# Patient Record
Sex: MALE | Race: WHITE | NOT HISPANIC OR LATINO | Employment: FULL TIME | URBAN - METROPOLITAN AREA
[De-identification: names, ages, dates, MRNs, and addresses within clinical notes are randomized per-mention and may not be internally consistent; named-entity substitution may affect disease eponyms.]

---

## 2020-03-12 ENCOUNTER — OFFICE VISIT (OUTPATIENT)
Dept: URGENT CARE | Facility: CLINIC | Age: 65
End: 2020-03-12
Payer: COMMERCIAL

## 2020-03-12 ENCOUNTER — APPOINTMENT (OUTPATIENT)
Dept: RADIOLOGY | Facility: CLINIC | Age: 65
End: 2020-03-12
Payer: COMMERCIAL

## 2020-03-12 VITALS
HEART RATE: 49 BPM | SYSTOLIC BLOOD PRESSURE: 132 MMHG | HEIGHT: 71 IN | TEMPERATURE: 97 F | WEIGHT: 230 LBS | OXYGEN SATURATION: 99 % | DIASTOLIC BLOOD PRESSURE: 56 MMHG | BODY MASS INDEX: 32.2 KG/M2 | RESPIRATION RATE: 16 BRPM

## 2020-03-12 DIAGNOSIS — S93.402A SPRAIN OF LEFT ANKLE, UNSPECIFIED LIGAMENT, INITIAL ENCOUNTER: ICD-10-CM

## 2020-03-12 DIAGNOSIS — S99.912A ANKLE INJURY, LEFT, INITIAL ENCOUNTER: Primary | ICD-10-CM

## 2020-03-12 DIAGNOSIS — S99.912A ANKLE INJURY, LEFT, INITIAL ENCOUNTER: ICD-10-CM

## 2020-03-12 PROCEDURE — 73610 X-RAY EXAM OF ANKLE: CPT

## 2020-03-12 PROCEDURE — 99213 OFFICE O/P EST LOW 20 MIN: CPT | Performed by: PHYSICIAN ASSISTANT

## 2020-03-12 RX ORDER — ATORVASTATIN CALCIUM 10 MG/1
TABLET, FILM COATED ORAL
COMMUNITY
Start: 2020-01-17

## 2020-03-12 RX ORDER — LOSARTAN POTASSIUM 50 MG/1
TABLET ORAL
COMMUNITY
Start: 2020-01-17

## 2020-03-12 RX ORDER — MELOXICAM 15 MG/1
TABLET ORAL
COMMUNITY
Start: 2020-03-07

## 2020-03-12 RX ORDER — HYDROCHLOROTHIAZIDE 25 MG/1
TABLET ORAL
COMMUNITY
Start: 2020-03-07

## 2020-03-12 RX ORDER — ASPIRIN 81 MG/1
81 TABLET, CHEWABLE ORAL DAILY
COMMUNITY

## 2020-03-12 RX ORDER — PROPRANOLOL HCL 60 MG
CAPSULE, EXTENDED RELEASE 24HR ORAL
COMMUNITY
Start: 2020-03-07

## 2020-03-12 RX ORDER — AMLODIPINE BESYLATE 10 MG/1
25 TABLET ORAL
COMMUNITY
Start: 2020-03-07

## 2020-03-12 NOTE — LETTER
March 12, 2020     Patient: Christie Wilks   YOB: 1955   Date of Visit: 3/12/2020       To Whom It May Concern: It is my medical opinion that Christie Wilks may return to work on 3/16/2020  If you have any questions or concerns, please don't hesitate to call           Sincerely,        Oziel Roque PA-C    CC: No Recipients

## 2020-03-12 NOTE — PROGRESS NOTES
[facility]        NAME: Isaac Livingston is a 59 y o  male  : 1955    MRN: [id number]  DATE: 2020  TIME: 9:37 AM    /56   Pulse (!) 49   Temp (!) 97 °F (36 1 °C) (Tympanic)   Resp 16   Ht 5' 11" (1 803 m)   Wt 104 kg (230 lb)   SpO2 99%   BMI 32 08 kg/m²     Assessment and Plan   Ankle injury, left, initial encounter [S93 822A]  1  Ankle injury, left, initial encounter  XR ankle 3+ vw left    Crutches    Splint   2  Sprain of left ankle, unspecified ligament, initial encounter  Crutches    Splint    Ambulatory referral to Orthopedic Surgery         Patient Instructions       Follow up with PCP in 3-5 days  Proceed to  ER if symptoms worsen  Chief Complaint     Chief Complaint   Patient presents with    Ankle Injury     twisted left ankle at [facility] last night and fell onto left ankle  Pain upon weight bearing  Screened for corona virus  Negative screening  History of Present Illness       Pt with left ankle pain and swelling since falling and twisting it last justin     Ankle Injury    The incident occurred 12 to 24 hours ago  The incident occurred at home  The injury mechanism was a twisting injury and a fall  The pain is present in the left ankle  The quality of the pain is described as aching  The pain is at a severity of 5/10  The pain is moderate  The pain has been constant since onset  Pertinent negatives include no inability to bear weight, loss of motion, loss of sensation, muscle weakness, numbness or tingling  He reports no foreign bodies present  The symptoms are aggravated by movement and weight bearing  He has tried nothing for the symptoms  The treatment provided no relief  Review of Systems   Review of Systems   Constitutional: Negative  HENT: Negative  Eyes: Negative  Respiratory: Negative  Cardiovascular: Negative  Gastrointestinal: Negative  Endocrine: Negative  Genitourinary: Negative  Musculoskeletal: Negative  Skin: Negative  Allergic/Immunologic: Negative  Neurological: Negative  Negative for tingling and numbness  Hematological: Negative  All other systems reviewed and are negative  Current Medications       Current Outpatient Medications:     amLODIPine (NORVASC) 10 mg tablet, , Disp: , Rfl:     aspirin 81 mg chewable tablet, Chew 81 mg daily, Disp: , Rfl:     atorvastatin (LIPITOR) 10 mg tablet, , Disp: , Rfl:     hydrochlorothiazide (HYDRODIURIL) 25 mg tablet, , Disp: , Rfl:     losartan (COZAAR) 50 mg tablet, , Disp: , Rfl:     meloxicam (MOBIC) 15 mg tablet, , Disp: , Rfl:     propranolol (INDERAL LA) 60 mg 24 hr capsule, , Disp: , Rfl:     Current Allergies     Allergies as of 03/12/2020 - Reviewed 03/12/2020   Allergen Reaction Noted    Adhesive [medical tape] Rash 03/12/2020            The following portions of the patient's history were reviewed and updated as appropriate: allergies, current medications, past family history, past medical history, past social history, past surgical history and problem list      Past Medical History:   Diagnosis Date    Arthritis     High cholesterol     Hypertension        Past Surgical History:   Procedure Laterality Date    ADENOIDECTOMY      CARPAL TUNNEL RELEASE Bilateral     MENISCECTOMY Bilateral     TONSILLECTOMY         Family History   Problem Relation Age of Onset    Heart disease Mother     Arthritis Mother     Heart disease Father          Medications have been verified  Objective   /56   Pulse (!) 49   Temp (!) 97 °F (36 1 °C) (Tympanic)   Resp 16   Ht 5' 11" (1 803 m)   Wt 104 kg (230 lb)   SpO2 99%   BMI 32 08 kg/m²        Physical Exam     Physical Exam   Constitutional: He is oriented to person, place, and time  He appears well-developed and well-nourished  HENT:   Head: Normocephalic and atraumatic     Right Ear: External ear normal    Left Ear: External ear normal    Nose: Nose normal  Mouth/Throat: Oropharynx is clear and moist    Eyes: Pupils are equal, round, and reactive to light  Conjunctivae and EOM are normal    Neck: Normal range of motion  Neck supple  Cardiovascular: Normal rate, regular rhythm, normal heart sounds and intact distal pulses  Pulmonary/Chest: Effort normal and breath sounds normal    Abdominal: Soft  Bowel sounds are normal    Musculoskeletal: Normal range of motion  Left ankle medial malleolus tender and swelling and ecchymosis  Lateral malleolus tender and swellng  dp pulse strong from ankle and toes with pain no foot pain  No achilles pain  No heel pain  No laxity     Neurological: He is alert and oriented to person, place, and time  Skin: Skin is warm  Capillary refill takes less than 2 seconds  Psychiatric: He has a normal mood and affect  His behavior is normal    Nursing note and vitals reviewed

## 2020-03-12 NOTE — PATIENT INSTRUCTIONS

## 2020-03-13 ENCOUNTER — APPOINTMENT (OUTPATIENT)
Dept: RADIOLOGY | Facility: CLINIC | Age: 65
End: 2020-03-13
Payer: COMMERCIAL

## 2020-03-13 VITALS
BODY MASS INDEX: 33.33 KG/M2 | HEIGHT: 71 IN | HEART RATE: 53 BPM | WEIGHT: 238.1 LBS | SYSTOLIC BLOOD PRESSURE: 154 MMHG | RESPIRATION RATE: 18 BRPM | DIASTOLIC BLOOD PRESSURE: 84 MMHG

## 2020-03-13 DIAGNOSIS — S93.422A SPRAIN OF DELTOID LIGAMENT OF LEFT ANKLE, INITIAL ENCOUNTER: ICD-10-CM

## 2020-03-13 DIAGNOSIS — M25.572 ACUTE LEFT ANKLE PAIN: Primary | ICD-10-CM

## 2020-03-13 DIAGNOSIS — M25.572 ACUTE LEFT ANKLE PAIN: ICD-10-CM

## 2020-03-13 PROCEDURE — 99203 OFFICE O/P NEW LOW 30 MIN: CPT | Performed by: ORTHOPAEDIC SURGERY

## 2020-03-13 PROCEDURE — 73600 X-RAY EXAM OF ANKLE: CPT

## 2020-03-13 NOTE — PROGRESS NOTES
ASSESSMENT/PLAN:    Diagnoses and all orders for this visit:    Acute left ankle pain    Sprain of deltoid ligament of left ankle, initial encounter        Plan:  I would recommend use of a walker cast boot  He is permitted to bear weight if tolerated, using crutches as needed  The boot may be removed for periods of inactivity if he chooses  He may shower or bathe, but I would recommend he sit to do so so as to avoid any further aggravation of his ankle  I will see him in 1 week  If symptoms do not seem to be improving, or if he is still unable to actively invert, an MRI will be obtained  He is to contact me if questions or concerns arise  No follow-ups on file       _____________________________________________________  CHIEF COMPLAINT:  Chief Complaint   Patient presents with    Left Ankle - Pain         SUBJECTIVE:  Yari Joyce is a 59y o  year old male who presents for evaluation of his left foot  He injured his left foot/ankle on 03/11/2020  He states 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 but is unsure of the exact mechanism of injury other than turning toward his left, twisting and falling  He was able to bear weight although with significant difficulty but could not continue bowling  Symptoms were worsening by the morning of 03/12/2020 and he presented to the urgent care center  X-rays were obtained and he was placed into a splint and instructed to seek orthopedic follow-up  He notes improvement with the splint in place  He denies paresthesias  He denies a history of previous injury or prodrome will symptoms  He denies any additional injuries of significance, stating only that he had a little scrape on his elbow      PAST MEDICAL HISTORY:  Past Medical History:   Diagnosis Date    Arthritis     High cholesterol     Hypertension        PAST SURGICAL HISTORY:  Past Surgical History:   Procedure Laterality Date    ADENOIDECTOMY      CARPAL TUNNEL RELEASE Bilateral     MENISCECTOMY Bilateral     TONSILLECTOMY         FAMILY HISTORY:  Family History   Problem Relation Age of Onset    Heart disease Mother     Arthritis Mother     Heart disease Father        SOCIAL HISTORY:  Social History     Tobacco Use    Smoking status: Never Smoker    Smokeless tobacco: Current User     Types: Chew   Substance Use Topics    Alcohol use: Never     Frequency: Never    Drug use: Never       MEDICATIONS:    Current Outpatient Medications:     amLODIPine (NORVASC) 10 mg tablet, , Disp: , Rfl:     aspirin 81 mg chewable tablet, Chew 81 mg daily, Disp: , Rfl:     atorvastatin (LIPITOR) 10 mg tablet, , Disp: , Rfl:     hydrochlorothiazide (HYDRODIURIL) 25 mg tablet, , Disp: , Rfl:     losartan (COZAAR) 50 mg tablet, , Disp: , Rfl:     meloxicam (MOBIC) 15 mg tablet, , Disp: , Rfl:     propranolol (INDERAL LA) 60 mg 24 hr capsule, , Disp: , Rfl:     ALLERGIES:  Allergies   Allergen Reactions    Adhesive [Medical Tape] Rash       Review of systems:   Constitutional: Negative for fatigue, fever or loss of apetite  HENT: Negative  Respiratory: Negative for shortness of breath, dyspnea  Cardiovascular: Negative for chest pain/tightness  Gastrointestinal: Negative for abdominal pain, N/V  Endocrine: Negative for cold/heat intolerance, unexplained weight loss/gain  Genitourinary: Negative for flank pain, dysuria, hematuria  Musculoskeletal:  Positive as in the HPI   Skin: Negative for rash  Neurological:  Negative  Psychiatric/Behavioral: Negative for agitation  _____________________________________________________  PHYSICAL EXAMINATION:    Blood pressure 154/84, pulse (!) 53, resp  rate 18, height 5' 11" (1 803 m), weight 108 kg (238 lb 1 6 oz)      General: well developed and well nourished, alert, oriented times 3 and appears comfortable  Psychiatric: Normal  HEENT: Benign  Cardiovascular: Regular    Pulmonary: No wheezing or stridor  Abdomen: Soft, Nontender  Skin: No masses, erythema, lacerations, fluctation, ulcerations  Neurovascular: Motor and sensory exams are grossly intact excluding decreased active inversion adduction of the left foot and ankle  Pulses are palpable and good color and capillary refill is noted  MUSCULOSKELETAL EXAMINATION:    The left foot and ankle exam demonstrated the splint in place upon arrival   This was removed without difficulty  There is significant ecchymosis and moderate swelling of the medial ankle and foot  He does have some tenderness laterally  He has most significant tenderness over the medial malleolus and the deltoid ligament  The fibular shaft was nontender  Syndesmotic compression elicits no complaints  The tibial shaft is nontender  He has no tenderness to palpation of the bony and soft tissue structures of the foot excluding the soft tissues slightly distal to the medial malleolus  He is able to actively dorsiflex, plantar flex and gavin the foot and ankle without difficulty  He has limited inversion  The remainder of the left lower extremity exam is benign  The bilateral upper extremity examination and right lower extremity examinations are benign  _____________________________________________________  STUDIES REVIEWED:  X-rays of his ankle demonstrated no evidence of bony injury  However, on the mortise image there does appear to be slight widening of the medial joint space in comparison to the superior joint space  A gravity stress x-ray of his left ankle was obtained  There is no change in the medial joint space of any significance      PROCEDURES:  Aric Samayoa

## 2020-03-20 ENCOUNTER — OFFICE VISIT (OUTPATIENT)
Dept: OBGYN CLINIC | Facility: CLINIC | Age: 65
End: 2020-03-20
Payer: COMMERCIAL

## 2020-03-20 VITALS
HEART RATE: 50 BPM | SYSTOLIC BLOOD PRESSURE: 130 MMHG | HEIGHT: 71 IN | WEIGHT: 235.89 LBS | DIASTOLIC BLOOD PRESSURE: 78 MMHG | BODY MASS INDEX: 33.02 KG/M2

## 2020-03-20 DIAGNOSIS — M25.572 ACUTE LEFT ANKLE PAIN: ICD-10-CM

## 2020-03-20 DIAGNOSIS — S93.422D SPRAIN OF DELTOID LIGAMENT OF LEFT ANKLE, SUBSEQUENT ENCOUNTER: Primary | ICD-10-CM

## 2020-03-20 PROCEDURE — 99213 OFFICE O/P EST LOW 20 MIN: CPT | Performed by: ORTHOPAEDIC SURGERY

## 2020-03-20 NOTE — PROGRESS NOTES
ASSESSMENT/PLAN:    Diagnoses and all orders for this visit:    Sprain of deltoid ligament of left ankle, subsequent encounter    Acute left ankle pain        Plan:  I discussed follow-up  At this time, secondary to the COVID-19 virus, I would recommend that he follow up as needed  I offered to see him in 2 weeks but he would prefer to return only if he feels his ankle does not continue improving  I have encouraged him to contact me and arrange for follow-up if he does not see continued improvement over the next 2 weeks  Return if symptoms worsen or fail to improve       _____________________________________________________  CHIEF COMPLAINT:  Chief Complaint   Patient presents with    Left Ankle - Pain, Follow-up         SUBJECTIVE:  Patito Muller is a 59y o  year old male who presents for follow up of his left ankle  He notes improvement over the last week  He has been able to walk around the house a little bit without a boot on  He is no longer experiencing any pain when he is in the boot  He removes the boot for sleeping without difficulty and for showering or bathing without difficulty  He does continue to experience some mild medial-sided ankle pain  Fiorella Little       PAST MEDICAL HISTORY:  Past Medical History:   Diagnosis Date    Arthritis     High cholesterol     Hypertension        PAST SURGICAL HISTORY:  Past Surgical History:   Procedure Laterality Date    ADENOIDECTOMY      CARPAL TUNNEL RELEASE Bilateral     MENISCECTOMY Bilateral     TONSILLECTOMY         FAMILY HISTORY:  Family History   Problem Relation Age of Onset    Heart disease Mother     Arthritis Mother     Heart disease Father        SOCIAL HISTORY:  Social History     Tobacco Use    Smoking status: Never Smoker    Smokeless tobacco: Current User     Types: Chew   Substance Use Topics    Alcohol use: Never     Frequency: Never    Drug use: Never       MEDICATIONS:    Current Outpatient Medications:     amLODIPine (NORVASC) 10 mg tablet, , Disp: , Rfl:     aspirin 81 mg chewable tablet, Chew 81 mg daily, Disp: , Rfl:     atorvastatin (LIPITOR) 10 mg tablet, , Disp: , Rfl:     hydrochlorothiazide (HYDRODIURIL) 25 mg tablet, , Disp: , Rfl:     losartan (COZAAR) 50 mg tablet, , Disp: , Rfl:     meloxicam (MOBIC) 15 mg tablet, , Disp: , Rfl:     propranolol (INDERAL LA) 60 mg 24 hr capsule, , Disp: , Rfl:     ALLERGIES:  Allergies   Allergen Reactions    Adhesive [Medical Tape] Rash       REVIEW OF SYSTEMS:  Pertinent items are noted in HPI  A comprehensive review of systems was negative       _____________________________________________________  PHYSICAL EXAMINATION:  General: alert, oriented times 3 and appears comfortable  Psychiatric: Normal  HEENT:  Normocephalic, atraumatic  Cardiovascular:  Regular  Pulmonary: No wheezing or stridor  Skin: No masses, erthema, lacerations, fluctation, ulcerations  Neurovascular: Motor and sensory exams are intact including good strength of resisted inversion and eversion of the ankle without complaints  Pulses are palpable and good color and capillary refill is noted  MUSCULOSKELETAL EXAMINATION:    The left ankle exam demonstrates some persistent swelling  He has mild tenderness over the anterior talofibular ligament  He also has some mild tenderness over the medial malleolus but more significant tenderness over the deltoid ligament  He demonstrated active inversion and good strength against resisted inversion without complaints  Sensation is intact  No complaints or elicited with subtalar or forefoot motion passively          Pati Peterson

## 2020-03-25 ENCOUNTER — OFFICE VISIT (OUTPATIENT)
Dept: URGENT CARE | Facility: CLINIC | Age: 65
End: 2020-03-25
Payer: COMMERCIAL

## 2020-03-25 VITALS
TEMPERATURE: 96.9 F | HEIGHT: 70 IN | BODY MASS INDEX: 32.93 KG/M2 | OXYGEN SATURATION: 99 % | DIASTOLIC BLOOD PRESSURE: 69 MMHG | SYSTOLIC BLOOD PRESSURE: 137 MMHG | WEIGHT: 230 LBS | RESPIRATION RATE: 16 BRPM | HEART RATE: 60 BPM

## 2020-03-25 DIAGNOSIS — S39.012A STRAIN OF MUSCLE, FASCIA AND TENDON OF LOWER BACK, INITIAL ENCOUNTER: Primary | ICD-10-CM

## 2020-03-25 PROCEDURE — 99213 OFFICE O/P EST LOW 20 MIN: CPT | Performed by: PHYSICIAN ASSISTANT

## 2020-03-25 RX ORDER — METHOCARBAMOL 750 MG/1
750 TABLET, FILM COATED ORAL EVERY 6 HOURS PRN
Qty: 20 TABLET | Refills: 0 | Status: SHIPPED | OUTPATIENT
Start: 2020-03-25 | End: 2020-03-30

## 2020-03-25 NOTE — PROGRESS NOTES
[facility]        NAME: Beth Moser is a 59 y o  male  : 1955    MRN: [id number]  DATE: 2020  TIME: 8:57 AM    Assessment and Plan   Strain of muscle, fascia and tendon of lower back, initial encounter [S39 012A]  1  Strain of muscle, fascia and tendon of lower back, initial encounter  methocarbamol (ROBAXIN) 750 mg tablet    Ambulatory referral to Physical Therapy     Patient advised to do back exercises at home for 1 week before making appointment with PT due to COVID 19  Patient Instructions   Robaxin as needed for muscle spasm  May use over-the-counter Tylenol and ibuprofen for pain  Do at home back exercises  Remain active! Follow-up with physical therapy if symptoms do not improve over the next week  Follow up with PCP in 3-5 days  Proceed to  ER if symptoms worsen  Chief Complaint     Chief Complaint   Patient presents with    Back Pain     c/o right sided lower back pain that radiates around to anterior thigh after using a walking boot followed by switching to leg brace after injuring left ankle 2 weeks ago  No fever, no SOB, no cough, no known contact with covid patient or travel to high risk areas  History of Present Illness       Patient is a 60-year-old male with significant past medical history of hypertension and hyperlipidemia who presents to the office complaining of right lower back pain for 6 days  Patient recently had an left ankle injury approximately 2 weeks ago and admits to walking with an antalgic gait  Patient states the pain is both achy and sharp  It radiates into the right gluteus and right groin  He reports having a dull ache with walking  He reports a pulling sensation with back extension and leg flexion  He has been taking meloxicam and Aleve with no relief  He has also been using ice and doing back exercises/stretching  Nothing makes it better    Patient denies numbness/tingling, saddle anesthesia, or loss of bowel or urine         Review of Systems   Review of Systems   Constitutional: Negative for chills and fever  Musculoskeletal: Positive for back pain  Negative for joint swelling and neck pain  Skin: Negative for wound  Neurological: Negative for numbness  Current Medications       Current Outpatient Medications:     amLODIPine (NORVASC) 10 mg tablet, , Disp: , Rfl:     aspirin 81 mg chewable tablet, Chew 81 mg daily, Disp: , Rfl:     atorvastatin (LIPITOR) 10 mg tablet, , Disp: , Rfl:     hydrochlorothiazide (HYDRODIURIL) 25 mg tablet, , Disp: , Rfl:     losartan (COZAAR) 50 mg tablet, , Disp: , Rfl:     meloxicam (MOBIC) 15 mg tablet, , Disp: , Rfl:     propranolol (INDERAL LA) 60 mg 24 hr capsule, , Disp: , Rfl:     methocarbamol (ROBAXIN) 750 mg tablet, Take 1 tablet (750 mg total) by mouth every 6 (six) hours as needed for muscle spasms for up to 5 days, Disp: 20 tablet, Rfl: 0    Current Allergies     Allergies as of 03/25/2020 - Reviewed 03/25/2020   Allergen Reaction Noted    Adhesive [medical tape] Rash 03/12/2020            The following portions of the patient's history were reviewed and updated as appropriate: allergies, current medications, past family history, past medical history, past social history, past surgical history and problem list      Past Medical History:   Diagnosis Date    Ankle injury     Arthritis     High cholesterol     Hypertension        Past Surgical History:   Procedure Laterality Date    ADENOIDECTOMY      CARPAL TUNNEL RELEASE Bilateral     MENISCECTOMY Bilateral     TONSILLECTOMY         Family History   Problem Relation Age of Onset    Heart disease Mother     Arthritis Mother     Heart disease Father          Medications have been verified          Objective   /69   Pulse 60   Temp (!) 96 9 °F (36 1 °C) (Tympanic)   Resp 16   Ht 5' 10" (1 778 m)   Wt 104 kg (230 lb)   SpO2 99%   BMI 33 00 kg/m²        Physical Exam     Physical Exam

## 2020-03-25 NOTE — PATIENT INSTRUCTIONS
Robaxin as needed for muscle spasm  May use over-the-counter Tylenol ibuprofen for pain  Do at home back exercises  Remain active! Follow-up with physical therapy if symptoms do not improve over the next week  Back Pain   WHAT YOU NEED TO KNOW:   Back pain is common  It can be caused by many conditions, such as arthritis or the breakdown of spinal discs  Your risk for back pain is increased by injuries, lack of activity, or repeated bending and twisting  You may feel sore or stiff on one or both sides of your back  The pain may spread to your buttocks or thighs  DISCHARGE INSTRUCTIONS:   Medicines:   · NSAIDs  help decrease swelling and pain  This medicine is available with or without a doctor's order  NSAIDs can cause stomach bleeding or kidney problems in certain people  If you take blood thinner medicine, always ask your healthcare provider if NSAIDs are safe for you  Always read the medicine label and follow directions  · Acetaminophen  decreases pain  It is available without a doctor's order  Ask how much to take and how often to take it  Follow directions  Acetaminophen can cause liver damage if not taken correctly  · Prescription pain medicine  may be given  Ask your healthcare provider how to take this medicine safely  · Take your medicine as directed  Contact your healthcare provider if you think your medicine is not helping or if you have side effects  Tell him or her if you are allergic to any medicine  Keep a list of the medicines, vitamins, and herbs you take  Include the amounts, and when and why you take them  Bring the list or the pill bottles to follow-up visits  Carry your medicine list with you in case of an emergency  Follow up with your healthcare provider in 2 weeks, or as directed:  Write down your questions so you remember to ask them during your visits    How to manage your back pain:   · Apply ice  on your back or affected area for 15 to 20 minutes every hour or as directed  Use an ice pack, or put crushed ice in a plastic bag  Cover it with a towel  Ice helps prevent tissue damage and decreases pain  · Apply heat  on your back or affected area for 20 to 30 minutes every 2 hours for as many days as directed  Heat helps decrease pain and muscle spasms  · Stay active  as much as you can without causing more pain  Bed rest could make your back pain worse  Avoid heavy lifting until your pain is gone  Return to the emergency department if:   · You have pain, numbness, or weakness in one or both legs  · Your pain becomes so severe that you cannot walk  · You cannot control your urine or bowel movements  · You have severe back pain with chest pain  · You have severe back pain, nausea, and vomiting  · You have severe back pain that spreads to your side or genital area  Contact your healthcare provider if:   · You have back pain that does not get better with rest and pain medicine  · You have a fever  · You have pain that worsens when you are on your back or when you rest     · You have pain that worsens when you cough or sneeze  · You lose weight without trying  · You have questions or concerns about your condition or care  © 2017 [employer]  Information is for End User's use only and may not be sold, redistributed or otherwise used for commercial purposes  All illustrations and images included in CareNotes® are the copyrighted property of [employer]  or Kody Ricci  The above information is an  only  It is not intended as medical advice for individual conditions or treatments  Talk to your doctor, nurse or pharmacist before following any medical regimen to see if it is safe and effective for you  Lower Back Exercises   WHAT YOU NEED TO KNOW:   Lower back exercises help heal and strengthen your back muscles to prevent another injury   Ask your healthcare provider if you need to see a physical therapist for more advanced exercises  DISCHARGE INSTRUCTIONS:   Return to the emergency department if:   · You have severe pain that prevents you from moving  Contact your healthcare provider if:   · Your pain becomes worse  · You have new pain  · You have questions or concerns about your condition or care  Do lower back exercises safely:   · Do the exercises on a mat or firm surface  (not on a bed) to support your spine and prevent low back pain  · Move slowly and smoothly  Avoid fast or jerky motions  · Breathe normally  Do not hold your breath  · Stop if you feel pain  It is normal to feel some discomfort at first  Regular exercise will help decrease your discomfort over time  Lower back exercises: Your healthcare provider may recommend that you do back exercises 10 to 30 minutes each day  He may also recommend that you do exercises 1 to 3 times each day  Ask your healthcare provider which exercises are best for you and how often to do them  · Ankle pumps:  Lie on your back  Move your foot up (with your toes pointing toward your head)  Then, move your foot down (with your toes pointing away from you)  Repeat this exercise 10 times on each side  · Heel slides:  Lie on your back  Slowly bend one leg and then straighten it  Next, bend the other leg and then straighten it  Repeat 10 times on each side  · Pelvic tilt:  Lie on your back with your knees bent and feet flat on the floor  Place your arms in a relaxed position beside your body  Tighten the muscles of your abdomen and flatten your back against the floor  Hold for 5 seconds  Repeat 5 times  · Back stretch:  Lie on your back with your hands behind your head  Bend your knees and turn the lower half of your body to one side  Hold this position for 10 seconds  Repeat 3 times on each side  · Straight leg raises:  Lie on your back with one leg straight  Bend the other knee   Tighten your abdomen and then slowly lift the straight leg up about 6 to 12 inches off the floor  Hold for 1 to 5 seconds  Lower your leg slowly  Repeat 10 times on each leg  · Knee-to-chest:  Lie on your back with your knees bent and feet flat on the floor  Pull one of your knees toward your chest and hold it there for 5 seconds  Return your leg to the starting position  Lift the other knee toward your chest and hold for 5 seconds  Do this 5 times on each side  · Cat and camel:  Place your hands and knees on the floor  Arch your back upward toward the ceiling and lower your head  Round out your spine as much as you can  Hold for 5 seconds  Lift your head upward and push your chest downward toward the floor  Hold for 5 seconds  Do 3 sets or as directed  · Wall squats:  Stand with your back against a wall  Tighten the muscles of your abdomen  Slowly lower your body until your knees are bent at a 45 degree angle  Hold this position for 5 seconds  Slowly move back up to a standing position  Repeat 10 times  · Curl up:  Lie on your back with your knees bent and feet flat on the floor  Place your hands, palms down, underneath the curve in your lower back  Next, with your elbows on the floor, lift your shoulders and chest 2 to 3 inches  Keep your head in line with your shoulders  Hold this position for 5 seconds  When you can do this exercise without pain for 10 to 15 seconds, you may add a rotation  While your shoulders and chest are lifted off the ground, turn slightly to the left and hold  Repeat on the other side  · Bird dog:  Place your hands and knees on the floor  Keep your wrists directly below your shoulders and your knees directly below your hips  Pull your belly button in toward your spine  Do not flatten or arch your back  Tighten your abdominal muscles  Raise one arm straight out so that it is aligned with your head  Next, raise the leg opposite your arm   Hold this position for 15 seconds  Lower your arm and leg slowly and change sides  Do 5 sets  © 2017 [address] Information is for End User's use only and may not be sold, redistributed or otherwise used for commercial purposes  All illustrations and images included in CareNotes® are the copyrighted property of [employer]  or Kody Ricci  The above information is an  only  It is not intended as medical advice for individual conditions or treatments  Talk to your doctor, nurse or pharmacist before following any medical regimen to see if it is safe and effective for you

## 2020-03-27 ENCOUNTER — EVALUATION (OUTPATIENT)
Dept: PHYSICAL THERAPY | Facility: CLINIC | Age: 65
End: 2020-03-27
Payer: COMMERCIAL

## 2020-03-27 DIAGNOSIS — S39.012A STRAIN OF MUSCLE, FASCIA AND TENDON OF LOWER BACK, INITIAL ENCOUNTER: Primary | ICD-10-CM

## 2020-03-27 PROCEDURE — 97110 THERAPEUTIC EXERCISES: CPT | Performed by: PHYSICAL THERAPIST

## 2020-03-27 PROCEDURE — 97140 MANUAL THERAPY 1/> REGIONS: CPT | Performed by: PHYSICAL THERAPIST

## 2020-03-27 PROCEDURE — 97161 PT EVAL LOW COMPLEX 20 MIN: CPT | Performed by: PHYSICAL THERAPIST

## 2020-03-27 NOTE — PROGRESS NOTES
PT Evaluation     Today's date: 3/27/2020  Patient name: Annita Lilly  : 1955  MRN: [id number]  Referring provider: Binta Grandchild, *  Dx:   Encounter Diagnosis     ICD-10-CM    1  Strain of muscle, fascia and tendon of lower back, initial encounter S39 012A Ambulatory referral to Physical Therapy                  Assessment  Assessment details: Annita Lilly is a pleasant 59 y o  male presenting to PT with cc of acute R sided low back and buttocks pain  Pt  States pain began 8 days ago secondary to transitioning to ASO brace due to L ankle sprain  Upon examination, patient was found to have objective deficits as listed below and is displaying ss consistent with extension restriction and piriformis syndrome with discogenic movement pattern  Pt  Is experiencing subsequent functional deficits including pain and difficulty standing for 5 minutes, sleeping, and performing work tasks as   Pt  Was educated on role of PT to address issues and given initial treatment with HEP and reports approx 50% improvement post session  Pt  Would benefit from skilled physical therapy to promote improved function and maximize activity tolerance  Symptom irritability: high  Goals  ST weeks  -Pt  Will demonstrate l/s extension AROM improvement of 25%  -Pt  Will demonstrate improved core stabilizer contraction, promoting improved muscle balance  LT weeks  -Pt  Will demonstrate ability to walk 1 mile without pain, demonstrating improved functional mobility   -Pt  Will demonstrate l/s extension AROM WNL  -Pt  Will demonstrate I with HEP upon DC  -Pt  Will demonstrate ability to effectively contract core mm with appropriate strength to perform lifting task      Plan  Patient would benefit from: skilled physical therapy  Planned modality interventions: cryotherapy, high voltage pulsed current: spasm management, high voltage pulsed current: pain management, unattended electrical stimulation and thermotherapy: hydrocollator packs  Planned therapy interventions: body mechanics training, balance, flexibility, functional ROM exercises, therapeutic training, therapeutic exercise, therapeutic activities, stretching, strengthening, postural training, patient education and neuromuscular re-education  Frequency: 2x week  Duration in weeks: 6  Plan of Care beginning date: 3/27/2020  Plan of Care expiration date: 2020  Treatment plan discussed with: patient        Subjective Evaluation    History of Present Illness  Date of onset: 3/19/2020  Mechanism of injury: Pt  Presents to Pt with cc of worsening R sided low back and buttocks pain or past week  He recently sprained L ankle and transitioned to cam boot and then into ASO brace  He notes antalgic gait pattern, but as pain in low back developed, experienced worsening flexed posture and shooting pain that radiates from R PSIS into anterior thigh/groin  He denies any bowel/bladder changes, fever/chills, or LE paresthesias  He does note disturbed sleep and awakes approx 10x a night  He was given mm relaxer 2 days ago in [facility] and referred to PT  He describes pain as severe and START back tool indicates psych score of 3  Pain  Current pain ratin  At best pain ratin  At worst pain rating: 10  Location: R PSIS, piriformis, low back  Quality: tight, sharp, radiating, pressure, pulling and knife-like  Relieving factors: relaxation  Aggravating factors: standing, walking, stair climbing, sitting and lifting  Progression: worsening    Social Support    Employment status: working  Treatments  Current treatment: medication  Patient Goals  Patient goals for therapy: increased motion, return to sport/leisure activities, independence with ADLs/IADLs, increased strength and return to work          Objective     Concurrent Complaints  Positive for night pain and disturbed sleep   Negative for bladder dysfunction, bowel dysfunction and saddle (S4) numbness    Tenderness     Additional Tenderness Details  Pt  TTP along R sided low back at [facility] of L2-4, PSIS, piriformis    Neurological Testing     Sensation     Lumbar   Left   Intact: light touch    Right   Intact: light touch    Reflexes   Left   Patellar (L4): trace (1+)    Right   Patellar (L4): trace (1+)    Active Range of Motion     Lumbar   Flexion:  WFL  Extension:  with pain Restriction level: maximal  Left lateral flexion:  WFL  Right lateral flexion:  with pain Restriction level: moderate  Left rotation:  WFL  Right rotation:  with pain Restriction level: moderate    Joint Play     Hypomobile: L2, L3 and L4     Pain: L2, L3 and L4   Mechanical Assessment    Cervical      Thoracic      Lumbar    Sitting flexion: repeated movements  Pain location: no change  Flexion on step, standing: repeated movements  Pain location: no change    Strength/Myotome Testing     Lumbar   Left   Normal strength    Right   Normal strength    Additional Strength Details  No palpale gluteal contraction at rest     General Comments:    Lower quarter screen   Knees: unremarkable  Foot/ankle: unremarkable    Hip Comments   (+) TRUDY with pain at end range  No crepitus or other impairments in hip         Flowsheet Rows      Most Recent Value   PT/OT G-Codes   Current Score  44   Projected Score  72   FOTO information reviewed  Yes             Precautions: HTN, L Deltoid Lig sprain (ASO)     Daily Treatment Diary:      Initial Evaluation Date: 03/27/20  Compliance 3/27                     Visit Number 1                    Re-Eval  IE                 MC   Foto Captured Y23/27                        Manual  3/27                     Piriformis STM 5'                    L/s STM 5'                    L/S Mobz gR 1-2 2'                                                                    Exercise Diary  3/27                     TrA with Gluteal iso 10x10" mod TC                     Prone extension increase with pillow removal 10' - able to get to no pillow for 5                     Prone on elbow -                     PPU -                     Squats -                     Piriformis Stretch -                     TrA with march in h/l -                                                                                                                                                                                                                                                                                                                            Modalities  3/27                    IFC with P 15'

## 2020-04-30 ENCOUNTER — EVALUATION (OUTPATIENT)
Dept: PHYSICAL THERAPY | Facility: CLINIC | Age: 65
End: 2020-04-30
Payer: COMMERCIAL

## 2020-04-30 DIAGNOSIS — M62.838 MUSCLE SPASM OF RIGHT LEG: ICD-10-CM

## 2020-04-30 DIAGNOSIS — S39.012A STRAIN OF MUSCLE, FASCIA AND TENDON OF LOWER BACK, INITIAL ENCOUNTER: Primary | ICD-10-CM

## 2020-04-30 PROCEDURE — 97110 THERAPEUTIC EXERCISES: CPT | Performed by: PHYSICAL THERAPIST

## 2020-04-30 PROCEDURE — 97140 MANUAL THERAPY 1/> REGIONS: CPT | Performed by: PHYSICAL THERAPIST

## 2020-04-30 PROCEDURE — 97112 NEUROMUSCULAR REEDUCATION: CPT | Performed by: PHYSICAL THERAPIST

## 2020-12-16 ENCOUNTER — EVALUATION (OUTPATIENT)
Dept: PHYSICAL THERAPY | Facility: CLINIC | Age: 65
End: 2020-12-16
Payer: MEDICARE

## 2020-12-16 DIAGNOSIS — M25.562 CHRONIC PAIN OF LEFT KNEE: ICD-10-CM

## 2020-12-16 DIAGNOSIS — G89.29 CHRONIC PAIN OF LEFT KNEE: ICD-10-CM

## 2020-12-16 DIAGNOSIS — M17.12 PRIMARY OSTEOARTHRITIS OF LEFT KNEE: Primary | ICD-10-CM

## 2020-12-16 DIAGNOSIS — Z96.652 PRESENCE OF TOTAL KNEE JOINT PROSTHESIS, LEFT: ICD-10-CM

## 2020-12-16 PROCEDURE — 97162 PT EVAL MOD COMPLEX 30 MIN: CPT | Performed by: PHYSICAL THERAPIST

## 2020-12-16 PROCEDURE — 97110 THERAPEUTIC EXERCISES: CPT | Performed by: PHYSICAL THERAPIST

## 2020-12-17 ENCOUNTER — APPOINTMENT (OUTPATIENT)
Dept: PHYSICAL THERAPY | Facility: CLINIC | Age: 65
End: 2020-12-17
Payer: MEDICARE

## 2020-12-18 ENCOUNTER — OFFICE VISIT (OUTPATIENT)
Dept: PHYSICAL THERAPY | Facility: CLINIC | Age: 65
End: 2020-12-18
Payer: MEDICARE

## 2020-12-18 DIAGNOSIS — M17.12 PRIMARY OSTEOARTHRITIS OF LEFT KNEE: Primary | ICD-10-CM

## 2020-12-18 DIAGNOSIS — M25.562 CHRONIC PAIN OF LEFT KNEE: ICD-10-CM

## 2020-12-18 DIAGNOSIS — Z96.652 PRESENCE OF TOTAL KNEE JOINT PROSTHESIS, LEFT: ICD-10-CM

## 2020-12-18 DIAGNOSIS — G89.29 CHRONIC PAIN OF LEFT KNEE: ICD-10-CM

## 2020-12-18 PROCEDURE — 97110 THERAPEUTIC EXERCISES: CPT | Performed by: PHYSICAL THERAPIST

## 2020-12-18 PROCEDURE — 97140 MANUAL THERAPY 1/> REGIONS: CPT | Performed by: PHYSICAL THERAPIST

## 2020-12-21 ENCOUNTER — OFFICE VISIT (OUTPATIENT)
Dept: PHYSICAL THERAPY | Facility: CLINIC | Age: 65
End: 2020-12-21
Payer: MEDICARE

## 2020-12-21 DIAGNOSIS — M17.12 PRIMARY OSTEOARTHRITIS OF LEFT KNEE: Primary | ICD-10-CM

## 2020-12-21 DIAGNOSIS — M25.562 CHRONIC PAIN OF LEFT KNEE: ICD-10-CM

## 2020-12-21 DIAGNOSIS — Z96.652 PRESENCE OF TOTAL KNEE JOINT PROSTHESIS, LEFT: ICD-10-CM

## 2020-12-21 DIAGNOSIS — G89.29 CHRONIC PAIN OF LEFT KNEE: ICD-10-CM

## 2020-12-21 PROCEDURE — 97140 MANUAL THERAPY 1/> REGIONS: CPT | Performed by: PHYSICAL THERAPIST

## 2020-12-21 PROCEDURE — 97110 THERAPEUTIC EXERCISES: CPT | Performed by: PHYSICAL THERAPIST

## 2020-12-23 ENCOUNTER — OFFICE VISIT (OUTPATIENT)
Dept: PHYSICAL THERAPY | Facility: CLINIC | Age: 65
End: 2020-12-23
Payer: MEDICARE

## 2020-12-23 DIAGNOSIS — M17.12 PRIMARY OSTEOARTHRITIS OF LEFT KNEE: Primary | ICD-10-CM

## 2020-12-23 DIAGNOSIS — M25.562 CHRONIC PAIN OF LEFT KNEE: ICD-10-CM

## 2020-12-23 DIAGNOSIS — G89.29 CHRONIC PAIN OF LEFT KNEE: ICD-10-CM

## 2020-12-23 DIAGNOSIS — Z96.652 PRESENCE OF TOTAL KNEE JOINT PROSTHESIS, LEFT: ICD-10-CM

## 2020-12-23 PROCEDURE — 97140 MANUAL THERAPY 1/> REGIONS: CPT | Performed by: PHYSICAL THERAPIST

## 2020-12-23 PROCEDURE — 97110 THERAPEUTIC EXERCISES: CPT | Performed by: PHYSICAL THERAPIST

## 2020-12-28 ENCOUNTER — OFFICE VISIT (OUTPATIENT)
Dept: PHYSICAL THERAPY | Facility: CLINIC | Age: 65
End: 2020-12-28
Payer: MEDICARE

## 2020-12-28 DIAGNOSIS — Z96.652 PRESENCE OF TOTAL KNEE JOINT PROSTHESIS, LEFT: ICD-10-CM

## 2020-12-28 DIAGNOSIS — M25.562 CHRONIC PAIN OF LEFT KNEE: ICD-10-CM

## 2020-12-28 DIAGNOSIS — G89.29 CHRONIC PAIN OF LEFT KNEE: ICD-10-CM

## 2020-12-28 DIAGNOSIS — M17.12 PRIMARY OSTEOARTHRITIS OF LEFT KNEE: Primary | ICD-10-CM

## 2020-12-28 PROCEDURE — 97112 NEUROMUSCULAR REEDUCATION: CPT | Performed by: PHYSICAL THERAPIST

## 2020-12-28 PROCEDURE — 97140 MANUAL THERAPY 1/> REGIONS: CPT | Performed by: PHYSICAL THERAPIST

## 2020-12-28 PROCEDURE — 97110 THERAPEUTIC EXERCISES: CPT | Performed by: PHYSICAL THERAPIST

## 2020-12-30 ENCOUNTER — OFFICE VISIT (OUTPATIENT)
Dept: PHYSICAL THERAPY | Facility: CLINIC | Age: 65
End: 2020-12-30
Payer: MEDICARE

## 2020-12-30 DIAGNOSIS — M25.562 CHRONIC PAIN OF LEFT KNEE: ICD-10-CM

## 2020-12-30 DIAGNOSIS — Z96.652 PRESENCE OF TOTAL KNEE JOINT PROSTHESIS, LEFT: ICD-10-CM

## 2020-12-30 DIAGNOSIS — M17.12 PRIMARY OSTEOARTHRITIS OF LEFT KNEE: Primary | ICD-10-CM

## 2020-12-30 DIAGNOSIS — G89.29 CHRONIC PAIN OF LEFT KNEE: ICD-10-CM

## 2020-12-30 PROCEDURE — 97140 MANUAL THERAPY 1/> REGIONS: CPT | Performed by: PHYSICAL THERAPIST

## 2020-12-30 PROCEDURE — 97110 THERAPEUTIC EXERCISES: CPT | Performed by: PHYSICAL THERAPIST

## 2021-01-04 ENCOUNTER — OFFICE VISIT (OUTPATIENT)
Dept: PHYSICAL THERAPY | Facility: CLINIC | Age: 66
End: 2021-01-04
Payer: MEDICARE

## 2021-01-04 DIAGNOSIS — M17.12 PRIMARY OSTEOARTHRITIS OF LEFT KNEE: Primary | ICD-10-CM

## 2021-01-04 DIAGNOSIS — G89.29 CHRONIC PAIN OF LEFT KNEE: ICD-10-CM

## 2021-01-04 DIAGNOSIS — M25.562 CHRONIC PAIN OF LEFT KNEE: ICD-10-CM

## 2021-01-04 DIAGNOSIS — Z96.652 PRESENCE OF TOTAL KNEE JOINT PROSTHESIS, LEFT: ICD-10-CM

## 2021-01-04 PROCEDURE — 97112 NEUROMUSCULAR REEDUCATION: CPT

## 2021-01-04 PROCEDURE — 97110 THERAPEUTIC EXERCISES: CPT

## 2021-01-04 PROCEDURE — 97140 MANUAL THERAPY 1/> REGIONS: CPT

## 2021-01-04 NOTE — PROGRESS NOTES
Daily Note     Today's date: 2021  Patient name: Fatemeh Kirkland  : 1955  MRN: [id number]  Referring provider: Huey Cronin MD  Dx:   Encounter Diagnosis     ICD-10-CM    1  Primary osteoarthritis of left knee  M17 12    2  Presence of total knee joint prosthesis, left  Z96 652    3  Chronic pain of left knee  M25 562     G89 29                   Subjective: Patient reports inside leg swelled right above the knee when wearing the stocking he was given last visit  Reports took it off immediately and now is wearing knee high compression socks  Objective: See treatment diary below      Assessment: Fatemeh Kirkland continues with steady  progress towards ROM and stength goals  Patient medial thigh swelling appears to have receded with some ecchymosis present  Abhi Im was advised to avoid using stocking for now  he required moderate verbal cueing to complete exercises appropriate form and intensity with no tactile cues  Patient function should improve with continued treatments  Plan: Continue per plan of care        Precautions: L TKA      Daily Treatment Diary:      Initial Evaluation Date: 20  Compliance          Visit Number 1 2  3  4  5  6  7         Re-Eval  IE -  -  -  -  -        [facility] Captured Y -  -  -  -  -                           Manual                      Patellofemoral mobz -  arb  10'  10  10'  5'  5'         STM/MLD prn -  assessed     5' stm quad, hs  5' stm  5'  5'                               Ther-Ex                      Warm up - - Bike stim 10'  bike stim 10' Bike stim 10'  bike- 10' rocking bike- 10' rocking         Heel slides 20x  20x  3'5"/5"  5' 5"/5"  5'5"/5"  5' 5"/5" c heel slides    5' 5"/5" c heel slides          Quad sets 20x  20x  c heel slides 5"/5"  c heel slides 5"/5", 10x prone  c heel slides  c heel slides   c heel slides          SAQ -  -  20x  20x c A for OP to TKE  20x x A for OP to TKE  20x x A for OP to TKE   20x x A for OP to TKE         LAQ c OP 20x  20x   3'      30x   30x         Hamstring stretch -  -  NV  c strap 4x30"  c strap 4x30"  c strap 4x30"   c strap 4x30"         APs 20x c towel under ankle  20x c towel under ankle c bolster 30x  c bolster 30x  c bolster 30x  c bolster 30x   c bolster 30x          Heel rise rocks 10x  10x  20x  20x  30x  30x  30x         squats 5x  10x  20x  20x  30x  30x 30x         Bolster stretch - - 3'  3'  3'  3'  3'         Prone kneebend - - -  20x  20x  20x  20x         SLR - -  2x5 c A  2x5 c A  3x5 no A  2x10 no A  2x10 no A         Step ups - - - - 20x 6" 20x 6" 6" x20                                             Ther-Act                                                               Modalities                      CP 10' 10'  10' 10'  Def   10'

## 2021-01-06 ENCOUNTER — APPOINTMENT (OUTPATIENT)
Dept: PHYSICAL THERAPY | Facility: CLINIC | Age: 66
End: 2021-01-06
Payer: MEDICARE

## 2021-01-11 ENCOUNTER — APPOINTMENT (OUTPATIENT)
Dept: PHYSICAL THERAPY | Facility: CLINIC | Age: 66
End: 2021-01-11
Payer: MEDICARE

## 2021-01-11 ENCOUNTER — TRANSCRIBE ORDERS (OUTPATIENT)
Dept: PHYSICAL THERAPY | Facility: CLINIC | Age: 66
End: 2021-01-11

## 2021-01-11 DIAGNOSIS — M17.12 PRIMARY OSTEOARTHRITIS OF LEFT KNEE: Primary | ICD-10-CM

## 2021-01-13 ENCOUNTER — APPOINTMENT (OUTPATIENT)
Dept: PHYSICAL THERAPY | Facility: CLINIC | Age: 66
End: 2021-01-13
Payer: MEDICARE

## 2021-01-18 ENCOUNTER — EVALUATION (OUTPATIENT)
Dept: PHYSICAL THERAPY | Facility: CLINIC | Age: 66
End: 2021-01-18
Payer: MEDICARE

## 2021-01-18 DIAGNOSIS — Z96.652 PRESENCE OF TOTAL KNEE JOINT PROSTHESIS, LEFT: ICD-10-CM

## 2021-01-18 DIAGNOSIS — G89.29 CHRONIC PAIN OF LEFT KNEE: ICD-10-CM

## 2021-01-18 DIAGNOSIS — M25.562 CHRONIC PAIN OF LEFT KNEE: ICD-10-CM

## 2021-01-18 DIAGNOSIS — M17.12 PRIMARY OSTEOARTHRITIS OF LEFT KNEE: Primary | ICD-10-CM

## 2021-01-18 PROCEDURE — 97110 THERAPEUTIC EXERCISES: CPT

## 2021-01-18 PROCEDURE — 97112 NEUROMUSCULAR REEDUCATION: CPT

## 2021-01-18 NOTE — PROGRESS NOTES
PT Re-Evaluation     Today's date: 2021  Patient name: Margarita Diaz  : 1955  MRN: [id number]  Referring provider: Maire Lesch, MD  Dx:   Encounter Diagnosis     ICD-10-CM    1  Primary osteoarthritis of left knee  M17 12    2  Presence of total knee joint prosthesis, left  Z96 652    3  Chronic pain of left knee  M25 562     G89 29                   Assessment  Assessment details: Margarita Diaz has continued with PT 2-3x/week, progressing as tolerated with treatment for Primary osteoarthritis of left knee  (primary encounter diagnosis), Presence of total knee joint prosthesis, left, Chronic pain of left knee    Pt  Is demonstrating improved objective measures and functional performance, but deficits remain and are limiting daily life  Pt  Would benefit from continued PT to further promote maximum activity tolerance and return to PLOF  Pt  Has been off of PT for past 2 weeks due to devyn COVid 19  No major set backs noted, though TKE remains reduced and we reiterated importance of HEP compliance and improtance of AROM during sub acute rehab phase  Understanding of Dx/Px/POC: excellent  Goals  ST weeks  -Pt  Will demonstrate L knee flexion AROM >90 degrees - met  -Pt  Will demonstrate appropriate gait pattern with SPC - met    LT weeks  -Pt  Will demonstrate L knee AROM WFL - not met  -Pt  Will demonstrate L knee strength WFL - not met  -Pt   Will demonstrate appropriate gait pattern without AD - met      Plan  Plan details: TKA protocol  Patient would benefit from: skilled physical therapy  Planned modality interventions: high voltage pulsed current: pain management, high voltage pulsed current: spasm management, unattended electrical stimulation and cryotherapy  Planned therapy interventions: activity modification, abdominal trunk stabilization, balance, balance/weight bearing training, body mechanics training, compression, coordination, dressing changes, gait training, graded activity, graded exercise, graded motor, home exercise program, therapeutic training, therapeutic exercise, therapeutic activities, stretching, strengthening, postural training, patient education, neuromuscular re-education, motor coordination training, joint mobilization, manual therapy and massage  Frequency: 2x week  Duration in weeks: 6  Plan of Care beginning date: 2021  Plan of Care expiration date: 3/1/2021  Treatment plan discussed with: family and patient        Subjective Evaluation    History of Present Illness  Date of surgery: 2020  Mechanism of injury: surgery  Mechanism of injury: Margarita Diaz returns to PT following 2 weeks off 2* to COVid-19  He states pain in knee has improved greatly as has walking tolerance, though stiffness from being sedentary/ill has increased  No major concerns at this time and remains motivated to improve  Pain  Current pain ratin  At best pain ratin  At worst pain ratin  Location: anteromedial L knee  Quality: tight, pressure, sharp, pulling, discomfort and cramping  Aggravating factors: walking, standing and lifting    Social Support  Steps to enter house: yes  Stairs in house: yes   Lives in: multiple-level home  Lives with: significant other    Patient Goals  Patient goals for therapy: decreased edema, decreased pain, improved balance, increased motion, return to work, return to [employer] activities, independence with ADLs/IADLs and increased strength          Objective     Observations   Left Knee   Positive for edema, effusion and incision       Active Range of Motion   Left Knee   Flexion: 104 degrees   Extension: 2 degrees     Passive Range of Motion   Left Knee   Flexion: 110 degrees   Extension: 1 degrees     Mobility   Patellar Mobility:   Left Knee   Hypomobile: left medial, left lateral, left superior and left inferior    Strength/Myotome Testing     Left Knee   Flexion: 4  Extension: 4  Quadriceps contraction: fair Precautions: L TKA 12/14     Daily Treatment Diary:      Initial Evaluation Date: 12/16/20  Compliance 12/16 12/18 12/21 12/23 12/28 12/30 1/4 1/18       Visit Number 1 2  3  4  5  6  7  8       Re-Eval  IE -  -  -  -  -    Y    MC   Foto Captured Y -  -  -  -  -    Y              12/16 12/18 12/21 12/23 12/28 12/30 1/4 1/18       Manual                      Patellofemoral mobz -  arb  10'  10  10'  5'  5'  5'       STM/MLD prn -  assessed     5' stm quad, hs  5' stm  5'  5'  5'                             Ther-Ex                      Warm up - - Bike stim 10'  bike stim 10' Bike stim 10'  bike- 10' rocking bike- 10' rocking  bike- 10' rocking       Heel slides 20x  20x  3'5"/5"  5' 5"/5"  5'5"/5"  5' 5"/5" c heel slides    5' 5"/5" c heel slides     5' 5"/5" c heel slides        Quad sets 20x  20x  c heel slides 5"/5"  c heel slides 5"/5", 10x prone  c heel slides  c heel slides   c heel slides    c heel slides        SAQ -  -  20x  20x c A for OP to TKE  20x x A for OP to TKE  20x x A for OP to TKE   20x x A for OP to TKE    30x x A for OP to TKE       LAQ c OP 20x  20x   3'      30x   30x         Hamstring stretch -  -  NV  c strap 4x30"  c strap 4x30"  c strap 4x30"   c strap 4x30"  c strap 4x30"       APs 20x c towel under ankle  20x c towel under ankle c bolster 30x  c bolster 30x  c bolster 30x  c bolster 30x   c bolster 30x   c bolster 30x        Heel rise rocks 10x  10x  20x  20x  30x  30x  30x  30x       squats 5x  10x  20x  20x  30x  30x 30x  30x       Bolster stretch - - 3'  3'  3'  3'  3'  3'       Prone kneebend - - -  20x  20x  20x  20x  20x       SLR - -  2x5 c A  2x5 c A  3x5 no A  2x10 no A  2x10 no A  2x10 no A       Step ups - - - - 20x 6" 20x 6" 6" x20 6"x20                                            Ther-Act                                                               Modalities                      CP 10' 10'  10' 10'  Def   10' 10'

## 2021-01-20 ENCOUNTER — OFFICE VISIT (OUTPATIENT)
Dept: PHYSICAL THERAPY | Facility: CLINIC | Age: 66
End: 2021-01-20
Payer: MEDICARE

## 2021-01-20 DIAGNOSIS — G89.29 CHRONIC PAIN OF LEFT KNEE: ICD-10-CM

## 2021-01-20 DIAGNOSIS — M25.562 CHRONIC PAIN OF LEFT KNEE: ICD-10-CM

## 2021-01-20 DIAGNOSIS — M17.12 PRIMARY OSTEOARTHRITIS OF LEFT KNEE: Primary | ICD-10-CM

## 2021-01-20 DIAGNOSIS — Z96.652 PRESENCE OF TOTAL KNEE JOINT PROSTHESIS, LEFT: ICD-10-CM

## 2021-01-20 PROCEDURE — 97140 MANUAL THERAPY 1/> REGIONS: CPT | Performed by: PHYSICAL THERAPIST

## 2021-01-20 PROCEDURE — 97110 THERAPEUTIC EXERCISES: CPT | Performed by: PHYSICAL THERAPIST

## 2021-01-20 PROCEDURE — 97112 NEUROMUSCULAR REEDUCATION: CPT | Performed by: PHYSICAL THERAPIST

## 2021-01-20 NOTE — PROGRESS NOTES
Daily Note     Today's date: 2021  Patient name: Dixon Tatum  : 1955  MRN: [id number]  Referring provider: Jazzy Little MD  Dx:   Encounter Diagnosis     ICD-10-CM    1  Primary osteoarthritis of left knee  M17 12    2  Presence of total knee joint prosthesis, left  Z96 652    3  Chronic pain of left knee  M25 562     G89 29                   Subjective: Pt  States pain in knee is isolated to superomedial knee  Objective: See treatment diary below      Assessment: extension restriction and compartmental swelling at distal VMO is contirbuting to pain  Pt  Educated on improtance of HEP  Pt  Would benefit from continued PT  Plan: Continue per plan of care  Progress treatment as tolerated         Precautions: L TKA      Daily Treatment Diary:      Initial Evaluation Date: 20  Compliance      Visit Number 1 2  3  4  5  6  7  8  9     Re-Eval  IE -  -  -  -  -    Y    MC   Foto Captured Y -  -  -  -  -    Y                   Manual                      Patellofemoral mobz -  arb  10'  10  10'  5'  5'  5'  5'     STM/MLD prn -  assessed     5' stm quad, hs  5' stm  5'  5'  5'  10'                           Ther-Ex                      Warm up - - Bike stim 10'  bike stim 10' Bike stim 10'  bike- 10' rocking bike- 10' rocking  bike- 10' rocking  bike 10'     Heel slides 20x  20x  3'5"/5"  5' 5"/5"  5'5"/5"  5' 5"/5" c heel slides    5' 5"/5" c heel slides     5' 5"/5" c heel slides   5' 5"/5", 3' 5"/5" c op c strap     Quad sets 20x  20x  c heel slides 5"/5"  c heel slides 5"/5", 10x prone  c heel slides  c heel slides   c heel slides    c heel slides   c heel slides 5'     SAQ -  -  20x  20x c A for OP to TKE  20x x A for OP to TKE  20x x A for OP to TKE   20x x A for OP to TKE    30x x A for OP to TKE  30x x A for OP      LAQ c OP 20x  20x   3'      30x   30x    30x Hamstring stretch -  -  NV  c strap 4x30"  c strap 4x30"  c strap 4x30"   c strap 4x30"  c strap 4x30"  c strap 4x30"     APs 20x c towel under ankle  20x c towel under ankle c bolster 30x  c bolster 30x  c bolster 30x  c bolster 30x   c bolster 30x   c bolster 30x   c bolster     Heel rise rocks 10x  10x  20x  20x  30x  30x  30x  30x  30x     squats 5x  10x  20x  20x  30x  30x 30x  30x  30x     Bolster stretch - - 3'  3'  3'  3'  3'  3'  3'     Prone kneebend - - -  20x  20x  20x  20x  20x  30x     SLR - -  2x5 c A  2x5 c A  3x5 no A  2x10 no A  2x10 no A  2x10 no A  30x     Step ups - - - - 20x 6" 20x 6" 6" x20 6"x20 8" 30x                                           Ther-Act                                                               Modalities                      CP 10' 10'  10' 10'  Def   10' 10'

## 2021-01-25 ENCOUNTER — OFFICE VISIT (OUTPATIENT)
Dept: PHYSICAL THERAPY | Facility: CLINIC | Age: 66
End: 2021-01-25
Payer: MEDICARE

## 2021-01-25 DIAGNOSIS — M25.562 CHRONIC PAIN OF LEFT KNEE: ICD-10-CM

## 2021-01-25 DIAGNOSIS — M17.12 PRIMARY OSTEOARTHRITIS OF LEFT KNEE: Primary | ICD-10-CM

## 2021-01-25 DIAGNOSIS — G89.29 CHRONIC PAIN OF LEFT KNEE: ICD-10-CM

## 2021-01-25 DIAGNOSIS — Z96.652 PRESENCE OF TOTAL KNEE JOINT PROSTHESIS, LEFT: ICD-10-CM

## 2021-01-25 PROCEDURE — 97112 NEUROMUSCULAR REEDUCATION: CPT

## 2021-01-25 PROCEDURE — 97140 MANUAL THERAPY 1/> REGIONS: CPT

## 2021-01-25 PROCEDURE — 97110 THERAPEUTIC EXERCISES: CPT

## 2021-01-25 NOTE — PROGRESS NOTES
Daily Note     Today's date: 2021  Patient name: Dixon Tatum  : 1955  MRN: [id number]  Referring provider: Jazzy Little MD  Dx:   Encounter Diagnosis     ICD-10-CM    1  Primary osteoarthritis of left knee  M17 12    2  Presence of total knee joint prosthesis, left  Z96 652    3  Chronic pain of left knee  M25 562     G89 29                   Subjective: Patient reports knee doing better every visit  Report thinking he is going back to work next week  Objective: See treatment diary below      Assessment: Dixon Tatum continues with steady progress towards knee function goals  Patient strength appears to be improving  he required moderate verbal cueing to complete exercises appropriate form and intensity with no tactile cues  Patient stability should improve with continued treatments  Plan: Continue per plan of care        Precautions: L TKA      Daily Treatment Diary:      Initial Evaluation Date: 20  Compliance    Visit Number 1 2  3  4  5  6  7  8  9  10   Re-Eval  IE -  -  -  -  -    Y    MC   Foto Captured Y -  -  -  -  -    Y                 Manual                      Patellofemoral mobz -  arb  10'  10  10'  5'  5'  5'  5'  5'   STM/MLD prn -  assessed     5' stm quad, hs  5' stm  5'  5'  5'  10'  10'                         Ther-Ex                      Warm up - - Bike stim 10'  bike stim 10' Bike stim 10'  bike- 10' rocking bike- 10' rocking  bike- 10' rocking  bike 10'  bike 10'   Heel slides 20x  20x  3'5"/5"  5' 5"/5"  5'5"/5"  5' 5"/5" c heel slides    5' 5"/5" c heel slides     5' 5"/5" c heel slides   5' 5"/5", 3' 5"/5" c op c strap  5' 5"/5", 5'' 5"/5" c op c strap   Quad sets 20x  20x  c heel slides 5"/5"  c heel slides 5"/5", 10x prone  c heel slides  c heel slides   c heel slides    c heel slides   c heel slides 5'  c heel slides 5' SAQ -  -  20x  20x c A for OP to TKE  20x x A for OP to TKE  20x x A for OP to TKE   20x x A for OP to TKE    30x x A for OP to TKE  30x x A for OP   30x x A for OP    LAQ c OP 20x  20x   3'      30x   30x    30x  30x   Hamstring stretch -  -  NV  c strap 4x30"  c strap 4x30"  c strap 4x30"   c strap 4x30"  c strap 4x30"  c strap 4x30"  c strap 4x30"   APs 20x c towel under ankle  20x c towel under ankle c bolster 30x  c bolster 30x  c bolster 30x  c bolster 30x   c bolster 30x   c bolster 30x   c bolster  c bolster 30x    Heel rise rocks 10x  10x  20x  20x  30x  30x  30x  30x  30x  30x   squats 5x  10x  20x  20x  30x  30x 30x  30x  30x  30x   Bolster stretch - - 3'  3'  3'  3'  3'  3'  3'  3' 3#   Prone kneebend - - -  20x  20x  20x  20x  20x  30x  standing 3x10   SLR - -  2x5 c A  2x5 c A  3x5 no A  2x10 no A  2x10 no A  2x10 no A  30x 30x   Step ups - - - - 20x 6" 20x 6" 6" x20 6"x20 8" 30x 8"x30                                          Ther-Act                                                               Modalities                      CP 10' 10'  10' 10'  Def   10' 10'

## 2021-01-27 ENCOUNTER — OFFICE VISIT (OUTPATIENT)
Dept: PHYSICAL THERAPY | Facility: CLINIC | Age: 66
End: 2021-01-27
Payer: MEDICARE

## 2021-01-27 DIAGNOSIS — Z96.652 PRESENCE OF TOTAL KNEE JOINT PROSTHESIS, LEFT: ICD-10-CM

## 2021-01-27 DIAGNOSIS — G89.29 CHRONIC PAIN OF LEFT KNEE: ICD-10-CM

## 2021-01-27 DIAGNOSIS — M25.562 CHRONIC PAIN OF LEFT KNEE: ICD-10-CM

## 2021-01-27 DIAGNOSIS — M17.12 PRIMARY OSTEOARTHRITIS OF LEFT KNEE: Primary | ICD-10-CM

## 2021-01-27 PROCEDURE — 97112 NEUROMUSCULAR REEDUCATION: CPT

## 2021-01-27 PROCEDURE — 97140 MANUAL THERAPY 1/> REGIONS: CPT

## 2021-01-27 PROCEDURE — 97110 THERAPEUTIC EXERCISES: CPT

## 2021-01-27 NOTE — PROGRESS NOTES
Daily Note     Today's date: 2021  Patient name: Anna Chen  : 1955  MRN: [id number]  Referring provider: Christie rGeen MD  Dx:   Encounter Diagnosis     ICD-10-CM    1  Primary osteoarthritis of left knee  M17 12    2  Presence of total knee joint prosthesis, left  Z96 652    3  Chronic pain of left knee  M25 562     G89 29                   Subjective: Patient presents to department with 3/10 rating on the pain scale  Describes pain on the lateral aspect of the knee as a sharp pain  Patient describes resting will decrease pain and stair climbing will make the pain worse  Mentions of staying active with walking the dog daily  Objective: See treatment diary below      Assessment: Anna Chen needed minimal cueing during session  Therapeutic exercise was effective in increasing knee function along with Ice treatment to decrease edema  Continuing active knee exercise should continue to improve as patient remains active at home  Plan: Continue per plan of care  Patient was treated by NEFTALI Mora under direct supervision of Tiffany Hayes PTA,IS,OPTA         Precautions: L TKA      Daily Treatment Diary:      Initial Evaluation Date: 20  Compliance    Visit Number 11          10   Re-Eval            BARRIE Ganto Captured                        Manual              Patellofemoral mobz 5'          5'   STM/MLD prn 10'          10'                 Ther-Ex              Warm up bike 10'          bike 10'   Heel slides  5' 5"/5", 5'' 5"/5" c op c strap          5' 5"/5", 5'' 5"/5" c op c strap   Quad sets c heel slides 5'          c heel slides 5'   SAQ  30x x A for OP           30x x A for OP    LAQ c OP 30x          30x   Hamstring stretch c strap 4x30"          c strap 4x30"   APs c bolster 30x           c bolster 30x    Heel rise rocks 30x          30x   squats 30x          30x   Bolster stretch 3' 3#          3' 3#   Prone kneebend standing 3x10          standing 3x10   SLR 30x         30x   Step ups 8"x30         8"x30                                          Ther-Act                                                       Modalities              CP 10'

## 2021-02-01 ENCOUNTER — APPOINTMENT (OUTPATIENT)
Dept: PHYSICAL THERAPY | Facility: CLINIC | Age: 66
End: 2021-02-01
Payer: MEDICARE

## 2021-02-03 ENCOUNTER — OFFICE VISIT (OUTPATIENT)
Dept: PHYSICAL THERAPY | Facility: CLINIC | Age: 66
End: 2021-02-03
Payer: MEDICARE

## 2021-02-03 DIAGNOSIS — G89.29 CHRONIC PAIN OF LEFT KNEE: ICD-10-CM

## 2021-02-03 DIAGNOSIS — Z96.652 PRESENCE OF TOTAL KNEE JOINT PROSTHESIS, LEFT: ICD-10-CM

## 2021-02-03 DIAGNOSIS — M17.12 PRIMARY OSTEOARTHRITIS OF LEFT KNEE: Primary | ICD-10-CM

## 2021-02-03 DIAGNOSIS — M25.562 CHRONIC PAIN OF LEFT KNEE: ICD-10-CM

## 2021-02-03 PROCEDURE — 97530 THERAPEUTIC ACTIVITIES: CPT

## 2021-02-03 PROCEDURE — 97110 THERAPEUTIC EXERCISES: CPT

## 2021-02-03 NOTE — PROGRESS NOTES
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  Objective: See treatment diary below      Assessment: Tolerated treatment well  Patient needed moderate cueing on maintaining proper form during exercise  Ice pack was beneficial   in reducing any swelling in the knee and controlling pain  Patient would benefit from continued PT      Plan: Continue per plan of care  Patient was treated by NEFTALI Rowe under direct supervision of Isrrael Douglass PTA,IS,OPTA       Precautions: L TKA      Daily Treatment Diary:      Initial Evaluation Date: 20  Compliance 1/27 2/3         1/25   Visit Number 11 12         10   Re-Eval            [facility]                     1/27 2/3         1/25   Manual              Patellofemoral mobz 5' 5'          5'   STM/MLD prn 10'          10'                 Ther-Ex              Warm up bike 10' Bike 10'         bike 10'   Heel slides  5' 5"/5", 5'' 5"/5" c op c strap 5"x30 c strap         5' 5"/5", 5'' 5"/5" c op c strap   Quad sets c heel slides 5' 2x20         c heel slides 5'   SAQ  30x x A for OP  30x         30x x A for OP    LAQ c OP 30x 30x         30x   Hamstring stretch c strap 4x30" c strap 4x30"         c strap 4x30"   APs c bolster 30x  c bolster 30x         c bolster 30x    Heel rise rocks 30x 30x         30x   squats 30x 30x         30x   Bolster stretch 3' 3# 3' 5#         3' 3#   Prone kneebend standing 3x10 Stand 3x10         standing 3x10   SLR 30x 30x        30x   Step ups 8"x30 8"x30        8"x30                                          Ther-Act                                                       Modalities CP 10' 10'

## 2021-02-05 ENCOUNTER — OFFICE VISIT (OUTPATIENT)
Dept: PHYSICAL THERAPY | Facility: CLINIC | Age: 66
End: 2021-02-05
Payer: MEDICARE

## 2021-02-05 DIAGNOSIS — G89.29 CHRONIC PAIN OF LEFT KNEE: ICD-10-CM

## 2021-02-05 DIAGNOSIS — M25.562 CHRONIC PAIN OF LEFT KNEE: ICD-10-CM

## 2021-02-05 DIAGNOSIS — M17.12 PRIMARY OSTEOARTHRITIS OF LEFT KNEE: Primary | ICD-10-CM

## 2021-02-05 DIAGNOSIS — Z96.652 PRESENCE OF TOTAL KNEE JOINT PROSTHESIS, LEFT: ICD-10-CM

## 2021-02-05 PROCEDURE — 97530 THERAPEUTIC ACTIVITIES: CPT

## 2021-02-05 PROCEDURE — 97110 THERAPEUTIC EXERCISES: CPT

## 2021-02-05 NOTE — PROGRESS NOTES
Daily Note     Today's date: 2021  Patient name: Matthias Carrington  : 1955  MRN: [id number]  Referring provider: Ronnie Mart MD  Dx:   Encounter Diagnosis     ICD-10-CM    1  Primary osteoarthritis of left knee  M17 12    2  Presence of total knee joint prosthesis, left  Z96 652    3  Chronic pain of left knee  M25 562     G89 29                   Subjective: Patient reports moving around at work with only a little difficulty now  Just stiffness is holding him back  Objective: See treatment diary below      Assessment: Matthias Carrington continues with steady  progress towards TKR protocol goals  Patient functional ambulation appears to be improving  he required moderate verbal cueing to complete exercises appropriate form and intensity with no tactile cues  Patient endurance should decrease with continued treatments  Plan: Continue per plan of care        Precautions: L TKA      Daily Treatment Diary:      Initial Evaluation Date: 20  Compliance 1/27 2/3 2/5        1/25   Visit Number 11 12 13        10   Re-Eval            [facility]                     1/27 2/3 2/5        1/25   Manual              Patellofemoral mobz 5' 5'  5'        5'   STM/MLD prn 10'  10'        10'                 Ther-Ex              Warm up bike 10' Bike 10' Bike 10'        bike 10'   Heel slides  5' 5"/5", 5'' 5"/5" c op c strap 5"x30 c strap 5"x30 c strap        5' 5"/5", 5'' 5"/5" c op c strap   Quad sets c heel slides 5' 2x20 PRONE  10"x10        c heel slides 5'   SAQ  30x x A for OP  30x 2#  x30        30x x A for OP    LAQ c OP 30x 30x 2# x30          30x   Hamstring stretch c strap 4x30" c strap 4x30" c strap 4x30"        c strap 4x30"   APs c bolster 30x  c bolster 30x -        c bolster 30x    Heel rise rocks 30x 30x 30x        30x   squats 30x 30x 30x        30x   Bolster stretch 3' 3# 3' 5# -        3' 3#   Prone kneebend standing 3x10 Stand 3x10 Stand 3x10 #       standing 3x10   SLR 30x 30x 1#  x30       30x   Step ups 8"x30 8"x30 8"x30       8"x30   Lateral step ups   4" 2x10          Clock reaches   x10          theraband TKE   BTB  x30                                                 Ther-Act                                                       Modalities              CP 10' 10' def

## 2021-02-08 ENCOUNTER — OFFICE VISIT (OUTPATIENT)
Dept: PHYSICAL THERAPY | Facility: CLINIC | Age: 66
End: 2021-02-08
Payer: MEDICARE

## 2021-02-08 DIAGNOSIS — Z96.652 PRESENCE OF TOTAL KNEE JOINT PROSTHESIS, LEFT: ICD-10-CM

## 2021-02-08 DIAGNOSIS — M17.12 PRIMARY OSTEOARTHRITIS OF LEFT KNEE: Primary | ICD-10-CM

## 2021-02-08 DIAGNOSIS — G89.29 CHRONIC PAIN OF LEFT KNEE: ICD-10-CM

## 2021-02-08 DIAGNOSIS — M25.562 CHRONIC PAIN OF LEFT KNEE: ICD-10-CM

## 2021-02-08 PROCEDURE — 97530 THERAPEUTIC ACTIVITIES: CPT

## 2021-02-08 PROCEDURE — 97110 THERAPEUTIC EXERCISES: CPT

## 2021-02-08 NOTE — PROGRESS NOTES
Daily Note     Today's date: 2021  Patient name: Christie Wilks  : 1955  MRN: [id number]  Referring provider: Pastora Pak MD  Dx:   Encounter Diagnosis     ICD-10-CM    1  Primary osteoarthritis of left knee  M17 12    2  Presence of total knee joint prosthesis, left  Z96 652    3  Chronic pain of left knee  M25 562     G89 29                    Subjective: Patient presents in a good mood to PT department  Patient reports no pain after PT session  Objective: See treatment diary below      Assessment: Tolerated treatment well  Therapeutic exercise is effective in increasing knee function and strength  Stretching exercise is beneficial for increasing and maintaining range of motion of lower extremities  Patient needed minimal cueing to maintain proper form during exercise  Patient would benefit from continued PT      Plan: Continue per plan of care  Patient was treated by NEFTALI Carvalho under direct supervision of RENÉ Vance PTA, OPTA       Precautions: L TKA      Daily Treatment Diary:      Initial Evaluation Date: 20  Compliance 1/27 2/3 2/5 2/8       1/25   Visit Number 11 12 13 14       10   [address]                      2/3 2/5 2/8       1/25   Manual              Patellofemoral mobz 5' 5'  5' 5'       5'   STM/MLD prn 10'  10' 10'       10'                 Ther-Ex              Warm up bike 10' Bike 10' Bike 10' Bike 10'       bike 10'   Heel slides  5' 5"/5", 5'' 5"/5" c op c strap 5"x30 c strap 5"x30 c strap 5"30 c strap       5' 5"/5", 5'' 5"/5" c op c strap   Quad sets c heel slides 5' 2x20 PRONE  10"x10 Prone 10"x10       c heel slides 5'   SAQ  30x x A for OP  30x 2#  x30 2#  x30       30x x A for OP    LAQ c OP 30x 30x 2# x30   2#  x30       30x   Hamstring stretch c strap 4x30" c strap 4x30" c strap 4x30" c strap 4x30"       c strap 4x30"   APs c bolster 30x  c bolster 30x - c  Bolster  30x       c bolster 30x    Heel rise rocks 30x 30x 30x 30x       30x   squats 30x 30x 30x 30x       30x   Bolster stretch 3' 3# 3' 5# - 3' 5#       3' 3#   Prone kneebend standing 3x10 Stand 3x10 Stand 3x10 #1  x30       standing 3x10   SLR 30x 30x 1#  x30 1#  x30      30x   Step ups 8"x30 8"x30 8"x30 8"x30      8"x30   Lateral step ups   4" 2x10 4" 2x10         Clock reaches   x10 x10         theraband TKE   BTB  x30 BTB  x30                                                Ther-Act                                                       Modalities              CP 10' 10' def Def

## 2021-02-10 ENCOUNTER — OFFICE VISIT (OUTPATIENT)
Dept: PHYSICAL THERAPY | Facility: CLINIC | Age: 66
End: 2021-02-10
Payer: MEDICARE

## 2021-02-10 DIAGNOSIS — M17.12 PRIMARY OSTEOARTHRITIS OF LEFT KNEE: Primary | ICD-10-CM

## 2021-02-10 DIAGNOSIS — M25.562 CHRONIC PAIN OF LEFT KNEE: ICD-10-CM

## 2021-02-10 DIAGNOSIS — Z96.652 PRESENCE OF TOTAL KNEE JOINT PROSTHESIS, LEFT: ICD-10-CM

## 2021-02-10 DIAGNOSIS — G89.29 CHRONIC PAIN OF LEFT KNEE: ICD-10-CM

## 2021-02-10 PROCEDURE — 97112 NEUROMUSCULAR REEDUCATION: CPT | Performed by: PHYSICAL THERAPIST

## 2021-02-10 PROCEDURE — 97110 THERAPEUTIC EXERCISES: CPT | Performed by: PHYSICAL THERAPIST

## 2021-02-10 PROCEDURE — 97140 MANUAL THERAPY 1/> REGIONS: CPT | Performed by: PHYSICAL THERAPIST

## 2021-02-10 NOTE — PROGRESS NOTES
Daily Note     Today's date: 2/10/2021  Patient name: Patito Muller  : 1955  MRN: [id number]  Referring provider: Nico Barbosa MD  Dx:   Encounter Diagnosis     ICD-10-CM    1  Primary osteoarthritis of left knee  M17 12    2  Presence of total knee joint prosthesis, left  Z96 652    3  Chronic pain of left knee  M25 562     G89 29                   Subjective: Pt  States knee is continuing to feel better every day  He has returned to work at full hours  Objective: See treatment diary below      Assessment: Patito Muller was progressed with PT interventions, focusing on appropriate technique and intensity  Pt  Required moderate cuing from therapist to complete  Distal quads remain irritated due to overuse 2* lack of TKE  Pt  Would benefit from continued physical therapy to promote improved activity tolerance and function  Plan: Continue per plan of care  Progress treatment as tolerated         Precautions: L TKA      Daily Treatment Diary:      Initial Evaluation Date: 20  Compliance 1/27 2/3 2/5 2/8 2/10      1/25   Visit Number 11 12 13 14 15      10   Re-Eval            MC   Foto Captured                     1/27 2/3 2/5 2/8 2/10      1/25   Manual              Patellofemoral mobz 5' 5'  5' 5' 5'      5'   STM/MLD prn 10'  10' 10' 10'      10'   Manual quad stretch c stm - - - - 5'         Ther-Ex              Warm up bike 10' Bike 10' Bike 10' Bike 10' Bike 10'      bike 10'   Heel slides  5' 5"/5", 5'' 5"/5" c op c strap 5"x30 c strap 5"x30 c strap 5"30 c strap 5" 30x       5' 5"/5", 5'' 5"/5" c op c strap   Quad sets c heel slides 5' 2x20 PRONE  10"x10 Prone 10"x10 Prone 10x10"      c heel slides 5'   SAQ  30x x A for OP  30x 2#  x30 2#  x30 4# 30x      30x x A for OP    LAQ c OP 30x 30x 2# x30   2#  x30 4# 30x      30x   Hamstring stretch c strap 4x30" c strap 4x30" c strap 4x30" c strap 4x30" c strap 4x30"      c strap 4x30"   APs c bolster 30x  c bolster 30x - c  Bolster  30x c bolster 30x      c bolster 30x    Heel rise rocks 30x 30x 30x 30x 30x      30x   squats 30x 30x 30x 30x 30x      30x   Bolster stretch 3' 3# 3' 5# - 3' 5# 3' 5#      3' 3#   Prone kneebend standing 3x10 Stand 3x10 Stand 3x10 #1  x30 1# 30x      standing 3x10   SLR 30x 30x 1#  x30 1#  x30 4# 30x     30x   Step ups 8"x30 8"x30 8"x30 8"x30 8" 30x     8"x30   Lateral step ups   4" 2x10 4" 2x10 8" 20x        Clock reaches   x10 x10 10x        theraband TKE   BTB  x30 BTB  x30 BTB 30x                                               Ther-Act                                                       Modalities              CP 10' 10' def Def

## 2021-02-15 ENCOUNTER — OFFICE VISIT (OUTPATIENT)
Dept: PHYSICAL THERAPY | Facility: CLINIC | Age: 66
End: 2021-02-15
Payer: MEDICARE

## 2021-02-15 DIAGNOSIS — G89.29 CHRONIC PAIN OF LEFT KNEE: ICD-10-CM

## 2021-02-15 DIAGNOSIS — Z96.652 PRESENCE OF TOTAL KNEE JOINT PROSTHESIS, LEFT: ICD-10-CM

## 2021-02-15 DIAGNOSIS — M17.12 PRIMARY OSTEOARTHRITIS OF LEFT KNEE: Primary | ICD-10-CM

## 2021-02-15 DIAGNOSIS — M25.562 CHRONIC PAIN OF LEFT KNEE: ICD-10-CM

## 2021-02-15 PROCEDURE — 97110 THERAPEUTIC EXERCISES: CPT | Performed by: PHYSICAL THERAPIST

## 2021-02-15 PROCEDURE — 97112 NEUROMUSCULAR REEDUCATION: CPT | Performed by: PHYSICAL THERAPIST

## 2021-02-15 PROCEDURE — 97140 MANUAL THERAPY 1/> REGIONS: CPT | Performed by: PHYSICAL THERAPIST

## 2021-02-15 NOTE — PROGRESS NOTES
Daily Note     Today's date: 2/15/2021  Patient name: Scott Acuna  : 1955  MRN: [id number]  Referring provider: Farrah Velásquez MD  Dx:   Encounter Diagnosis     ICD-10-CM    1  Primary osteoarthritis of left knee  M17 12    2  Presence of total knee joint prosthesis, left  Z96 652    3  Chronic pain of left knee  M25 562     G89 29                   Subjective: Gene notes that distal quads are feeling much better since last session  Objective: See treatment diary below      Assessment: Tolerated treatment well  Patient demonstrated fatigue post treatment, exhibited good technique with therapeutic exercises and would benefit from continued PT      Plan: Continue per plan of care  Progress treatment as tolerated         Precautions: L TKA      Daily Treatment Diary:      Initial Evaluation Date: 20  Compliance 1/27 2/3 2/5 2/8 2/10 2/15     1/25   Visit Number 11 12 13 14 15 16     10   Re-Eval            MC   Foto Captured                     1/27 2/3 2/5 2/8 2/10 2/16     1/25   Manual              Patellofemoral mobz 5' 5'  5' 5' 5' -     5'   STM/MLD prn 10'  10' 10' 10' 10'     10'   Manual quad stretch c stm - - - - 5' 5'        Ther-Ex              Warm up bike 10' Bike 10' Bike 10' Bike 10' Bike 10' Bike 10'     bike 10'   Heel slides  5' 5"/5", 5'' 5"/5" c op c strap 5"x30 c strap 5"x30 c strap 5"30 c strap 5" 30x  5"x30x     5' 5"/5", 5'' 5"/5" c op c strap   Quad sets c heel slides 5' 2x20 PRONE  10"x10 Prone 10"x10 Prone 10x10" Prone 10x10"     c heel slides 5'   SAQ  30x x A for OP  30x 2#  x30 2#  x30 4# 30x 5# 20x     30x x A for OP    LAQ c OP 30x 30x 2# x30   2#  x30 4# 30x 5# 20x     30x   Hamstring stretch c strap 4x30" c strap 4x30" c strap 4x30" c strap 4x30" c strap 4x30" c strap 4x30"     c strap 4x30"   APs c bolster 30x  c bolster 30x - c  Bolster  30x c bolster 30x c bolster 30x     c bolster 30x    Heel rise rocks 30x 30x 30x 30x 30x 30x     30x   squats 30x 30x 30x 30x 30x 30x     30x   Bolster stretch 3' 3# 3' 5# - 3' 5# 3' 5# 3' 5#     3' 3#   Prone kneebend standing 3x10 Stand 3x10 Stand 3x10 #1  x30 1# 30x 0# 30x     standing 3x10   SLR 30x 30x 1#  x30 1#  x30 4# 30x 5# 20x    30x   Step ups 8"x30 8"x30 8"x30 8"x30 8" 30x 8" 30x    8"x30   Lateral step ups   4" 2x10 4" 2x10 8" 20x 8" 30x       Clock reaches   x10 x10 10x Y balance 2x1'       theraband TKE   BTB  x30 BTB  x30 BTB 30x BTB 30x       TS c twist - - - - - 2x1'                                 Ther-Act                                                       Modalities              CP 10' 10' def Def

## 2021-02-17 ENCOUNTER — OFFICE VISIT (OUTPATIENT)
Dept: PHYSICAL THERAPY | Facility: CLINIC | Age: 66
End: 2021-02-17
Payer: MEDICARE

## 2021-02-17 DIAGNOSIS — G89.29 CHRONIC PAIN OF LEFT KNEE: ICD-10-CM

## 2021-02-17 DIAGNOSIS — M25.562 CHRONIC PAIN OF LEFT KNEE: ICD-10-CM

## 2021-02-17 DIAGNOSIS — Z96.652 PRESENCE OF TOTAL KNEE JOINT PROSTHESIS, LEFT: ICD-10-CM

## 2021-02-17 DIAGNOSIS — M17.12 PRIMARY OSTEOARTHRITIS OF LEFT KNEE: Primary | ICD-10-CM

## 2021-02-17 PROCEDURE — 97110 THERAPEUTIC EXERCISES: CPT

## 2021-02-17 PROCEDURE — 97112 NEUROMUSCULAR REEDUCATION: CPT

## 2021-02-17 PROCEDURE — 97140 MANUAL THERAPY 1/> REGIONS: CPT

## 2021-02-17 NOTE — PROGRESS NOTES
Daily Note     Today's date: 2021  Patient name: Gustavo Soria  : 1955  MRN: [id number]  Referring provider: Jen Mcintyre MD  Dx:   Encounter Diagnosis     ICD-10-CM    1  Primary osteoarthritis of left knee  M17 12    2  Presence of total knee joint prosthesis, left  Z96 652    3  Chronic pain of left knee  M25 562     G89 29                   Subjective: Patient reports was a little sore after last visit but movement is much better  Reports able to walk around work with minimal difficulty  Objective: See treatment diary below      Assessment: Gustavo Soria continues with steady  progress towards functional use goals  Patient knee ROM appears to be improving  Some discomfort which may be retinaculum which decreased post treatment  he required moderate verbal cueing to complete exercises appropriate form and intensity with no tactile cues  Patient ROM should normalize with continued treatments  Plan: Continue per plan of care        Precautions: L TKA      Daily Treatment Diary:      Initial Evaluation Date: 20  Compliance 1/27 2/3 2/5 2/8 2/10 2/16 2/17    1/25   Visit Number 11 12 13 14 15 16 17    10   Re-Eval               Foto Captured                     1/27 2/3 2/5 2/8 2/10 2/16 2/17    1/25   Manual              Patellofemoral mobz 5' 5'  5' 5' 5' - -    5'   STM/MLD prn 10'  10' 10' 10' 10' 10'    10'   Manual quad stretch c stm - - - - 5' 5'        Ther-Ex              Warm up bike 10' Bike 10' Bike 10' Bike 10' Bike 10' Bike 10' Bike 10'    bike 10'   Heel slides  5' 5"/5", 5'' 5"/5" c op c strap 5"x30 c strap 5"x30 c strap 5"30 c strap 5" 30x  5"x30x 5"x30x    5' 5"/5", 5'' 5"/5" c op c strap   Quad sets c heel slides 5' 2x20 PRONE  10"x10 Prone 10"x10 Prone 10x10" Prone 10x10" Prone 10x10"    c heel slides 5'   SAQ  30x x A for OP  30x 2#  x30 2#  x30 4# 30x 5# 20x 5# 20x    30x x A for OP    LAQ c OP 30x 30x 2# x30   2#  x30 4# 30x 5# 20x 5# 20x    30x Hamstring stretch c strap 4x30" c strap 4x30" c strap 4x30" c strap 4x30" c strap 4x30" c strap 4x30" c strap 4x30"    c strap 4x30"   APs c bolster 30x  c bolster 30x - c  Bolster  30x c bolster 30x c bolster 30x c bolster 30x    c bolster 30x    Heel rise rocks 30x 30x 30x 30x 30x 30x 30x    30x   squats 30x 30x 30x 30x 30x 30x 30x    30x   Bolster stretch 3' 3# 3' 5# - 3' 5# 3' 5# 3' 5# 3' 5#    3' 3#   Prone kneebend standing 3x10 Stand 3x10 Stand 3x10 #1  x30 1# 30x 0# 30x 0# 30x    standing 3x10   SLR 30x 30x 1#  x30 1#  x30 4# 30x 5# 20x 5# 20x   30x   Step ups 8"x30 8"x30 8"x30 8"x30 8" 30x 8" 30x 8" 30x   8"x30   Lateral step ups   4" 2x10 4" 2x10 8" 20x 8" 30x held      Clock reaches   x10 x10 10x Y balance 2x1' held      theraband TKE   BTB  x30 BTB  x30 BTB 30x BTB 30x held      TS c twist - - - - - 2x1' held                                Ther-Act                                                       Modalities              CP 10' 10' def Def   10'

## 2021-02-22 ENCOUNTER — APPOINTMENT (OUTPATIENT)
Dept: PHYSICAL THERAPY | Facility: CLINIC | Age: 66
End: 2021-02-22
Payer: MEDICARE

## 2021-02-24 ENCOUNTER — EVALUATION (OUTPATIENT)
Dept: PHYSICAL THERAPY | Facility: CLINIC | Age: 66
End: 2021-02-24
Payer: MEDICARE

## 2021-02-24 DIAGNOSIS — M17.12 PRIMARY OSTEOARTHRITIS OF LEFT KNEE: Primary | ICD-10-CM

## 2021-02-24 DIAGNOSIS — Z96.652 PRESENCE OF TOTAL KNEE JOINT PROSTHESIS, LEFT: ICD-10-CM

## 2021-02-24 DIAGNOSIS — M25.562 CHRONIC PAIN OF LEFT KNEE: ICD-10-CM

## 2021-02-24 DIAGNOSIS — G89.29 CHRONIC PAIN OF LEFT KNEE: ICD-10-CM

## 2021-02-24 PROCEDURE — 97140 MANUAL THERAPY 1/> REGIONS: CPT | Performed by: PHYSICAL THERAPIST

## 2021-02-24 PROCEDURE — 97110 THERAPEUTIC EXERCISES: CPT | Performed by: PHYSICAL THERAPIST

## 2021-02-24 PROCEDURE — 97112 NEUROMUSCULAR REEDUCATION: CPT | Performed by: PHYSICAL THERAPIST

## 2021-02-24 NOTE — PROGRESS NOTES
PT Re-Evaluation     Today's date: 2021  Patient name: Severiano Floras  : 1955  MRN: [id number]  Referring provider: Will Duncan MD  Dx:   Encounter Diagnosis     ICD-10-CM    1  Primary osteoarthritis of left knee  M17 12    2  Presence of total knee joint prosthesis, left  Z96 652    3  Chronic pain of left knee  M25 562     G89 29                   Assessment  Assessment details: Severiano Floras has continued with PT 2-3x/week, progressing as tolerated with treatment for Primary osteoarthritis of left knee  (primary encounter diagnosis), Presence of total knee joint prosthesis, left, Chronic pain of left knee  To this point, Gene is making very good progress post TKA with strength near contralateral side  We had slowed progress due to 2-3 weeks off with COVID but returned to normal protocol  At this point I recommend 2 more weeks of therapy to progress balance/proprioception in preparation of DC  Understanding of Dx/Px/POC: excellent  Goals  ST weeks  -Pt  Will demonstrate L knee flexion AROM >90 degrees - met  -Pt  Will demonstrate appropriate gait pattern with SPC - met    LT weeks  -Pt  Will demonstrate L knee AROM WFL - not met  -Pt  Will demonstrate L knee strength WFL - not met  -Pt   Will demonstrate appropriate gait pattern without AD - met      Plan  Plan details: TKA protocol  Patient would benefit from: skilled physical therapy  Planned modality interventions: high voltage pulsed current: pain management, high voltage pulsed current: spasm management, unattended electrical stimulation and cryotherapy  Planned therapy interventions: activity modification, abdominal trunk stabilization, balance, balance/weight bearing training, body mechanics training, compression, coordination, dressing changes, gait training, graded activity, graded exercise, graded motor, home exercise program, therapeutic training, therapeutic exercise, therapeutic activities, stretching, strengthening, postural training, patient education, neuromuscular re-education, motor coordination training, joint mobilization, manual therapy and massage  Frequency: 2x week  Duration in weeks: 4  Plan of Care beginning date: 2021  Plan of Care expiration date: 3/24/2021  Treatment plan discussed with: family and patient        Subjective Evaluation    History of Present Illness  Date of surgery: 2020  Mechanism of injury: surgery  Mechanism of injury: Margarita Diaz has continued with PT 2x/week progressing as tolerated  4 weeks ago Gene had 2-3 week quarantine due to [place] which prolonged recovery partly  He is now very pleased with recovery and hopes to continue with PT for another 2 weeks  Pain  Current pain ratin  At best pain ratin  At worst pain rating: 3  Location: distal quads  Quality: tight and pressure  Aggravating factors: walking, standing and lifting    Social Support  Steps to enter house: yes  Stairs in house: yes   Lives in: multiple-level home  Lives with: significant other    Patient Goals  Patient goals for therapy: decreased edema, decreased pain, improved balance, increased motion, return to work, return to [employer] activities, independence with ADLs/IADLs and increased strength          Objective     Observations   Left Knee   Positive for edema, effusion and incision  Active Range of Motion   Left Knee   Flexion: 112 degrees   Extension: 1 degrees     Passive Range of Motion   Left Knee   Flexion: 116 degrees   Extension: 0 degrees     Mobility   Patellar Mobility:   Left Knee   WFL: medial, lateral, superior and inferior       Strength/Myotome Testing     Left Knee   Flexion: 4+  Prone flexion: 4+  Extension: 5  Quadriceps contraction: good             Precautions: L TKA      Daily Treatment Diary:      Initial Evaluation Date: 20  Compliance  2/3 2/5 2/8 2/10 2/16 2/17 2/24     Visit Number 11 14 13 14 15 16 17 18     Re-Eval         Y     Foto Captured Y            1/27 2/3 2/5 2/8 2/10 2/16 2/17 2/24     Manual             Patellofemoral mobz 5' 5'  5' 5' 5' - -      STM/MLD prn 10'  10' 10' 10' 10' 10' 10'     Manual quad stretch c stm - - - - 5' 5'       Ther-Ex             Warm up bike 10' Bike 10' Bike 10' Bike 10' Bike 10' Bike 10' Bike 10' Bike 10'     Heel slides  5' 5"/5", 5'' 5"/5" c op c strap 5"x30 c strap 5"x30 c strap 5"30 c strap 5" 30x  5"x30x 5"x30x 5"x30x     Quad sets c heel slides 5' 2x20 PRONE  10"x10 Prone 10"x10 Prone 10x10" Prone 10x10" Prone 10x10" Prone 10x10"     SAQ  30x x A for OP  30x 2#  x30 2#  x30 4# 30x 5# 20x 5# 20x 5# 30x no break     LAQ c OP 30x 30x 2# x30   2#  x30 4# 30x 5# 20x 5# 20x 5# 30x     Hamstring stretch c strap 4x30" c strap 4x30" c strap 4x30" c strap 4x30" c strap 4x30" c strap 4x30" c strap 4x30" c strap 4x30"     APs c bolster 30x  c bolster 30x - c  Bolster  30x c bolster 30x c bolster 30x c bolster 30x c bolster 30x     Heel rise rocks 30x 30x 30x 30x 30x 30x 30x 30x     squats 30x 30x 30x 30x 30x 30x 30x 30x     Bolster stretch 3' 3# 3' 5# - 3' 5# 3' 5# 3' 5# 3' 5# 10# 3'     Prone kneebend standing 3x10 Stand 3x10 Stand 3x10 #1  x30 1# 30x 0# 30x 0# 30x 5# 20x     SLR 30x 30x 1#  x30 1#  x30 4# 30x 5# 20x 5# 20x 5# 20x     Step ups 8"x30 8"x30 8"x30 8"x30 8" 30x 8" 30x 8" 30x 8" 30x     Lateral step ups   4" 2x10 4" 2x10 8" 20x 8" 30x held -     Clock reaches   x10 x10 10x Y balance 2x1' held -     theraband TKE   BTB  x30 BTB  x30 BTB 30x BTB 30x held -     TS c twist - - - - - 2x1' held 2'      TS c ball toss - - - - - - - 2'     SLS c AE        2'     Ther-Act                                                      Modalities             CP 10' 10' def Def   10'

## 2021-02-24 NOTE — LETTER
2021    MD Willi Jim[phone]    Patient: Gustavo Soria   YOB: 1955   Date of Visit: 2021     Encounter Diagnosis     ICD-10-CM    1  Primary osteoarthritis of left knee  M17 12    2  Presence of total knee joint prosthesis, left  Z96 652    3  Chronic pain of left knee  M25 562     G89 29        Dear Dr Valencia Monaco: Thank you for your recent referral of Gustavo Soria  Please review the attached evaluation summary from Kt's recent visit  Please verify that you agree with the plan of care by signing the attached order  If you have any questions or concerns, please do not hesitate to call  I sincerely appreciate the opportunity to share in the care of one of your patients and hope to have another opportunity to work with you in the near future  Sincerely,    Dani Trotter PT      Referring Provider:      I certify that I have read the below Plan of Care and certify the need for these services furnished under this plan of treatment while under my care  MD Willi [address]enez [phone]  Via Fax: [phone]          PT Re-Evaluation     Today's date: 2021  Patient name: Gustavo Soria  : 1955  MRN: [id number]  Referring provider: Jen Mcintyre MD  Dx:   Encounter Diagnosis     ICD-10-CM    1  Primary osteoarthritis of left knee  M17 12    2  Presence of total knee joint prosthesis, left  Z96 652    3  Chronic pain of left knee  M25 562     G89 29                   Assessment  Assessment details: Gustavo Soria has continued with PT 2-3x/week, progressing as tolerated with treatment for Primary osteoarthritis of left knee  (primary encounter diagnosis), Presence of total knee joint prosthesis, left, Chronic pain of left knee  To this point, Gene is making very good progress post TKA with strength near contralateral side   We had slowed progress due to 2-3 weeks off with COVID but returned to normal protocol  At this point I recommend 2 more weeks of therapy to progress balance/proprioception in preparation of DC  Understanding of Dx/Px/POC: excellent  Goals  ST weeks  -Pt  Will demonstrate L knee flexion AROM >90 degrees - met  -Pt  Will demonstrate appropriate gait pattern with SPC - met    LT weeks  -Pt  Will demonstrate L knee AROM WFL - not met  -Pt  Will demonstrate L knee strength WFL - not met  -Pt  Will demonstrate appropriate gait pattern without AD - met      Plan  Plan details: TKA protocol  Patient would benefit from: skilled physical therapy  Planned modality interventions: high voltage pulsed current: pain management, high voltage pulsed current: spasm management, unattended electrical stimulation and cryotherapy  Planned therapy interventions: activity modification, abdominal trunk stabilization, balance, balance/weight bearing training, body mechanics training, compression, coordination, dressing changes, gait training, graded activity, graded exercise, graded motor, home exercise program, therapeutic training, therapeutic exercise, therapeutic activities, stretching, strengthening, postural training, patient education, neuromuscular re-education, motor coordination training, joint mobilization, manual therapy and massage  Frequency: 2x week  Duration in weeks: 4  Plan of Care beginning date: 2021  Plan of Care expiration date: 3/24/2021  Treatment plan discussed with: family and patient        Subjective Evaluation    History of Present Illness  Date of surgery: 2020  Mechanism of injury: surgery  Mechanism of injury: Attila Rich has continued with PT 2x/week progressing as tolerated  4 weeks ago Gene had 2-3 week quarantine due to [place] which prolonged recovery partly  He is now very pleased with recovery and hopes to continue with PT for another 2 weeks     Pain  Current pain ratin  At best pain ratin  At worst pain rating: 3  Location: distal quads  Quality: tight and pressure  Aggravating factors: walking, standing and lifting    Social Support  Steps to enter house: yes  Stairs in house: yes   Lives in: multiple-level home  Lives with: significant other    Patient Goals  Patient goals for therapy: decreased edema, decreased pain, improved balance, increased motion, return to work, return to [employer] activities, independence with ADLs/IADLs and increased strength          Objective     Observations   Left Knee   Positive for edema, effusion and incision  Active Range of Motion   Left Knee   Flexion: 112 degrees   Extension: 1 degrees     Passive Range of Motion   Left Knee   Flexion: 116 degrees   Extension: 0 degrees     Mobility   Patellar Mobility:   Left Knee   WFL: medial, lateral, superior and inferior       Strength/Myotome Testing     Left Knee   Flexion: 4+  Prone flexion: 4+  Extension: 5  Quadriceps contraction: good             Precautions: L TKA      Daily Treatment Diary:      Initial Evaluation Date: 20  Compliance 1/27 2/3 2/5 2/8 2/10 2/16 2/17 2/24     Visit Number 11 12 13 14 15 16 17 18     Re-Eval         Y     Foto Captured        Y            1/27 2/3 2/5 2/8 2/10 2/16 2/17 2/24     Manual             Patellofemoral mobz 5' 5'  5' 5' 5' - -      STM/MLD prn 10'  10' 10' 10' 10' 10' 10'     Manual quad stretch c stm - - - - 5' 5'       Ther-Ex             Warm up bike 10' Bike 10' Bike 10' Bike 10' Bike 10' Bike 10' Bike 10' Bike 10'     Heel slides  5' 5"/5", 5'' 5"/5" c op c strap 5"x30 c strap 5"x30 c strap 5"30 c strap 5" 30x  5"x30x 5"x30x 5"x30x     Quad sets c heel slides 5' 2x20 PRONE  10"x10 Prone 10"x10 Prone 10x10" Prone 10x10" Prone 10x10" Prone 10x10"     SAQ  30x x A for OP  30x 2#  x30 2#  x30 4# 30x 5# 20x 5# 20x 5# 30x no break     LAQ c OP 30x 30x 2# x30   2#  x30 4# 30x 5# 20x 5# 20x 5# 30x     Hamstring stretch c strap 4x30" c strap 4x30" c strap 4x30" c strap 4x30" c strap 4x30" c strap 4x30" c strap 4x30" c strap 4x30"     APs c bolster 30x  c bolster 30x - c  Bolster  30x c bolster 30x c bolster 30x c bolster 30x c bolster 30x     Heel rise rocks 30x 30x 30x 30x 30x 30x 30x 30x     squats 30x 30x 30x 30x 30x 30x 30x 30x     Bolster stretch 3' 3# 3' 5# - 3' 5# 3' 5# 3' 5# 3' 5# 10# 3'     Prone kneebend standing 3x10 Stand 3x10 Stand 3x10 #1  x30 1# 30x 0# 30x 0# 30x 5# 20x     SLR 30x 30x 1#  x30 1#  x30 4# 30x 5# 20x 5# 20x 5# 20x     Step ups 8"x30 8"x30 8"x30 8"x30 8" 30x 8" 30x 8" 30x 8" 30x     Lateral step ups   4" 2x10 4" 2x10 8" 20x 8" 30x held -     Clock reaches   x10 x10 10x Y balance 2x1' held -     theraband TKE   BTB  x30 BTB  x30 BTB 30x BTB 30x held -     TS c twist - - - - - 2x1' held 2'      TS c ball toss - - - - - - - 2'     SLS c AE        2'     Ther-Act                                                      Modalities             CP 10' 10' def Def   10'

## 2021-03-01 ENCOUNTER — OFFICE VISIT (OUTPATIENT)
Dept: PHYSICAL THERAPY | Facility: CLINIC | Age: 66
End: 2021-03-01
Payer: MEDICARE

## 2021-03-01 DIAGNOSIS — M25.562 CHRONIC PAIN OF LEFT KNEE: ICD-10-CM

## 2021-03-01 DIAGNOSIS — G89.29 CHRONIC PAIN OF LEFT KNEE: ICD-10-CM

## 2021-03-01 DIAGNOSIS — Z96.652 PRESENCE OF TOTAL KNEE JOINT PROSTHESIS, LEFT: ICD-10-CM

## 2021-03-01 DIAGNOSIS — M17.12 PRIMARY OSTEOARTHRITIS OF LEFT KNEE: Primary | ICD-10-CM

## 2021-03-01 PROCEDURE — 97110 THERAPEUTIC EXERCISES: CPT

## 2021-03-01 PROCEDURE — 97530 THERAPEUTIC ACTIVITIES: CPT

## 2021-03-01 NOTE — PROGRESS NOTES
Daily Note     Today's date: 3/1/2021  Patient name: Nabeel Perez  : 1955  MRN: [id number]  Referring provider: Bisi Pang MD  Dx:   Encounter Diagnosis     ICD-10-CM    1  Primary osteoarthritis of left knee  M17 12    2  Presence of total knee joint prosthesis, left  Z96 652    3  Chronic pain of left knee  M25 562     G89 29                    Subjective: Patient presents to PT department in a good mood  States knee has gotten better since starting  Patient did not mention any discomfort before or after session  Objective: See treatment diary below      Assessment: Tolerated treatment well  Theraputic exercise was effective in strengthening L knee to increase functional outcome  Knee stretching was beneficial to increase range of motion and make exercises more efficient  Minimal instruction was needed, however, patient needed moderate cueing to maintain proper positioning during activities  Patient would benefit from continued PT      Plan: Continue per plan of care  Patient was treated by NEFTALI Arauz  Supervised by Soo Sung PTA, [facility]       Precautions: L TKA      Daily Treatment Diary:      Initial Evaluation Date: 20  Compliance 1/27 2/3 2/5 2/8 2/10 2/16 2/17 2/24 3/1    Visit Number 11 12 13 14 15 16 17 18 19    Re-Eval         Y     Foto Captured        Y            1/27 2/3 2/5 2/8 2/10 2/16 2/17 2/24 3/1    Manual             Patellofemoral mobz 5' 5'  5' 5' 5' - -  5'    STM/MLD prn 10'  10' 10' 10' 10' 10' 10' -    Manual quad stretch c stm - - - - 5' 5'   5'    Ther-Ex             Warm up bike 10' Bike 10' Bike 10' Bike 10' Bike 10' Bike 10' Bike 10' Bike 10' Bike 10'    Heel slides  5' 5"/5", 5'' 5"/5" c op c strap 5"x30 c strap 5"x30 c strap 5"30 c strap 5" 30x  5"x30x 5"x30x 5"x30x 5" x30    Quad sets c heel slides 5' 2x20 PRONE  10"x10 Prone 10"x10 Prone 10x10" Prone 10x10" Prone 10x10" Prone 10x10" Prone 10x10"    SAQ  30x x A for OP  30x 2#  x30 2#  x30 4# 30x 5# 20x 5# 20x 5# 30x no break 5# 30x no break    LAQ c OP 30x 30x 2# x30   2#  x30 4# 30x 5# 20x 5# 20x 5# 30x 5# 30x    Hamstring stretch c strap 4x30" c strap 4x30" c strap 4x30" c strap 4x30" c strap 4x30" c strap 4x30" c strap 4x30" c strap 4x30" c strap 4x30"    APs c bolster 30x  c bolster 30x - c  Bolster  30x c bolster 30x c bolster 30x c bolster 30x c bolster 30x c bolster 30x    Heel rise rocks 30x 30x 30x 30x 30x 30x 30x 30x 30x    squats 30x 30x 30x 30x 30x 30x 30x 30x 30x    Bolster stretch 3' 3# 3' 5# - 3' 5# 3' 5# 3' 5# 3' 5# 10# 3' 10# 3'    Prone kneebend standing 3x10 Stand 3x10 Stand 3x10 #1  x30 1# 30x 0# 30x 0# 30x 5# 20x 5# 20x    SLR 30x 30x 1#  x30 1#  x30 4# 30x 5# 20x 5# 20x 5# 20x 5#20x    Step ups 8"x30 8"x30 8"x30 8"x30 8" 30x 8" 30x 8" 30x 8" 30x 8" 30x    Lateral step ups   4" 2x10 4" 2x10 8" 20x 8" 30x held - 8"30x    Clock reaches   x10 x10 10x Y balance 2x1' held - -    theraband TKE   BTB  x30 BTB  x30 BTB 30x BTB 30x held - -    TS c twist - - - - - 2x1' held 2'  2'    TS c ball toss - - - - - - - 2' 2'    SLS c AE        2' 2'    Ther-Act                                                      Modalities             CP 10' 10' def Def   10'  def

## 2021-03-03 ENCOUNTER — OFFICE VISIT (OUTPATIENT)
Dept: PHYSICAL THERAPY | Facility: CLINIC | Age: 66
End: 2021-03-03
Payer: MEDICARE

## 2021-03-03 DIAGNOSIS — M25.562 CHRONIC PAIN OF LEFT KNEE: ICD-10-CM

## 2021-03-03 DIAGNOSIS — Z96.652 PRESENCE OF TOTAL KNEE JOINT PROSTHESIS, LEFT: ICD-10-CM

## 2021-03-03 DIAGNOSIS — G89.29 CHRONIC PAIN OF LEFT KNEE: ICD-10-CM

## 2021-03-03 DIAGNOSIS — M17.12 PRIMARY OSTEOARTHRITIS OF LEFT KNEE: Primary | ICD-10-CM

## 2021-03-03 PROCEDURE — 97140 MANUAL THERAPY 1/> REGIONS: CPT

## 2021-03-03 PROCEDURE — 97110 THERAPEUTIC EXERCISES: CPT

## 2021-03-03 NOTE — PROGRESS NOTES
Daily Note/Discharge Summary     Today's date: 3/3/2021  Patient name: Dixon Tatum  : 1955  MRN: [id number]  Referring provider: Jazzy Little MD  Dx:   Encounter Diagnosis     ICD-10-CM    1  Primary osteoarthritis of left knee  M17 12    2  Presence of total knee joint prosthesis, left  Z96 652    3  Chronic pain of left knee  M25 562     G89 29                   Subjective: Patient presents to PT department in a good mood  Excited today is his last day of PT  Objective: See treatment diary below      Assessment: Tolerated treatment well  Patient has good understanding of HEP and progression  He should do well with D/C to HEP  Plan: Patient D/C to HEP as per PT POC  Follow up as necessary  Patient was treated by NEFTALI Casanova  Supervised by Markie Sweeney PTA, [facility]       Precautions: L TKA      Daily Treatment Diary:      Initial Evaluation Date: 20  Compliance 1/27 2/3 2/5 2/8 2/10 2/16 2/17 2/24 3/1 3/3   Visit Number 11 12 13 14 15 16 17 18 19 20   Re-Eval         Y     Foto Captured        Y            1/27 2/3 2/5 2/8 2/10 2/16 2/17 2/24 3/1 3/3   Manual             Patellofemoral mobz 5' 5'  5' 5' 5' - -  5' 5'   STM/MLD prn 10'  10' 10' 10' 10' 10' 10' - -   Manual quad stretch c stm - - - - 5' 5'   5' 5'   Ther-Ex             Warm up bike 10' Bike 10' Bike 10' Bike 10' Bike 10' Bike 10' Bike 10' Bike 10' Bike 10' Bike 10'   Heel slides  5' 5"/5", 5'' 5"/5" c op c strap 5"x30 c strap 5"x30 c strap 5"30 c strap 5" 30x  5"x30x 5"x30x 5"x30x 5" x30 5"x30   Quad sets c heel slides 5' 2x20 PRONE  10"x10 Prone 10"x10 Prone 10x10" Prone 10x10" Prone 10x10" Prone 10x10" Prone 10x10" Prone 10x10"   SAQ  30x x A for OP  30x 2#  x30 2#  x30 4# 30x 5# 20x 5# 20x 5# 30x no break 5# 30x no break 5# 30x no break   LAQ c OP 30x 30x 2# x30   2#  x30 4# 30x 5# 20x 5# 20x 5# 30x 5# 30x 5# 30x   Hamstring stretch c strap 4x30" c strap 4x30" c strap 4x30" c strap 4x30" c strap 4x30" c strap 4x30" c strap 4x30" c strap 4x30" c strap 4x30" c strap 4x30"   APs c bolster 30x  c bolster 30x - c  Bolster  30x c bolster 30x c bolster 30x c bolster 30x c bolster 30x c bolster 30x c bolster 30x   Heel rise rocks 30x 30x 30x 30x 30x 30x 30x 30x 30x 30x   squats 30x 30x 30x 30x 30x 30x 30x 30x 30x 30x   Bolster stretch 3' 3# 3' 5# - 3' 5# 3' 5# 3' 5# 3' 5# 10# 3' 10# 3' 10# 3'   Prone kneebend standing 3x10 Stand 3x10 Stand 3x10 #1  x30 1# 30x 0# 30x 0# 30x 5# 20x 5# 20x 5# 20x   SLR 30x 30x 1#  x30 1#  x30 4# 30x 5# 20x 5# 20x 5# 20x 5#20x 5# 20x   Step ups 8"x30 8"x30 8"x30 8"x30 8" 30x 8" 30x 8" 30x 8" 30x 8" 30x 8"30x   Lateral step ups   4" 2x10 4" 2x10 8" 20x 8" 30x held - 8"30x 8" 30x   Clock reaches   x10 x10 10x Y balance 2x1' held - -    theraband TKE   BTB  x30 BTB  x30 BTB 30x BTB 30x held - -    TS c twist - - - - - 2x1' held 2'  2' 2'   TS c ball toss - - - - - - - 2' 2' 2'   SLS c AE        2' 2' 2'   Ther-Act                                                      Modalities             CP 10' 10' def Def   10'  def def

## 2021-03-08 ENCOUNTER — APPOINTMENT (OUTPATIENT)
Dept: PHYSICAL THERAPY | Facility: CLINIC | Age: 66
End: 2021-03-08
Payer: MEDICARE

## 2021-03-08 ENCOUNTER — TRANSCRIBE ORDERS (OUTPATIENT)
Dept: PHYSICAL THERAPY | Facility: CLINIC | Age: 66
End: 2021-03-08

## 2021-03-08 DIAGNOSIS — M17.12 PRIMARY OSTEOARTHRITIS OF LEFT KNEE: Primary | ICD-10-CM

## 2021-03-10 ENCOUNTER — APPOINTMENT (OUTPATIENT)
Dept: PHYSICAL THERAPY | Facility: CLINIC | Age: 66
End: 2021-03-10
Payer: MEDICARE

## 2021-03-15 ENCOUNTER — APPOINTMENT (OUTPATIENT)
Dept: PHYSICAL THERAPY | Facility: CLINIC | Age: 66
End: 2021-03-15
Payer: MEDICARE

## 2021-03-17 ENCOUNTER — APPOINTMENT (OUTPATIENT)
Dept: PHYSICAL THERAPY | Facility: CLINIC | Age: 66
End: 2021-03-17
Payer: MEDICARE

## 2021-03-22 ENCOUNTER — APPOINTMENT (OUTPATIENT)
Dept: PHYSICAL THERAPY | Facility: CLINIC | Age: 66
End: 2021-03-22
Payer: MEDICARE

## 2021-03-24 ENCOUNTER — APPOINTMENT (OUTPATIENT)
Dept: PHYSICAL THERAPY | Facility: CLINIC | Age: 66
End: 2021-03-24
Payer: MEDICARE

## 2021-03-29 ENCOUNTER — APPOINTMENT (OUTPATIENT)
Dept: PHYSICAL THERAPY | Facility: CLINIC | Age: 66
End: 2021-03-29
Payer: MEDICARE

## 2021-03-31 ENCOUNTER — APPOINTMENT (OUTPATIENT)
Dept: PHYSICAL THERAPY | Facility: CLINIC | Age: 66
End: 2021-03-31
Payer: MEDICARE

## 2021-07-01 ENCOUNTER — OFFICE VISIT (OUTPATIENT)
Dept: URGENT CARE | Facility: CLINIC | Age: 66
End: 2021-07-01
Payer: MEDICARE

## 2021-07-01 ENCOUNTER — HOSPITAL ENCOUNTER (EMERGENCY)
Facility: HOSPITAL | Age: 66
Discharge: HOME | End: 2021-07-01
Attending: EMERGENCY MEDICINE
Payer: MEDICARE

## 2021-07-01 VITALS
HEART RATE: 53 BPM | RESPIRATION RATE: 20 BRPM | SYSTOLIC BLOOD PRESSURE: 145 MMHG | OXYGEN SATURATION: 97 % | BODY MASS INDEX: 30.5 KG/M2 | WEIGHT: 218.7 LBS | TEMPERATURE: 97.9 F | DIASTOLIC BLOOD PRESSURE: 72 MMHG

## 2021-07-01 VITALS
SYSTOLIC BLOOD PRESSURE: 152 MMHG | HEIGHT: 71 IN | OXYGEN SATURATION: 98 % | WEIGHT: 220 LBS | BODY MASS INDEX: 30.8 KG/M2 | HEART RATE: 62 BPM | RESPIRATION RATE: 16 BRPM | DIASTOLIC BLOOD PRESSURE: 71 MMHG | TEMPERATURE: 98 F

## 2021-07-01 DIAGNOSIS — R53.1 GENERALIZED WEAKNESS: Primary | ICD-10-CM

## 2021-07-01 DIAGNOSIS — R53.1 WEAKNESS: Primary | ICD-10-CM

## 2021-07-01 DIAGNOSIS — E87.1 HYPONATREMIA: ICD-10-CM

## 2021-07-01 DIAGNOSIS — R25.2 MUSCLE CRAMPS: ICD-10-CM

## 2021-07-01 DIAGNOSIS — E86.0 DEHYDRATION: ICD-10-CM

## 2021-07-01 DIAGNOSIS — R10.33 PERIUMBILICAL PAIN: ICD-10-CM

## 2021-07-01 DIAGNOSIS — E87.6 HYPOKALEMIA: ICD-10-CM

## 2021-07-01 DIAGNOSIS — R31.9 HEMATURIA: ICD-10-CM

## 2021-07-01 LAB
ALBUMIN SERPL BCP-MCNC: 3.4 G/DL (ref 3.5–5)
ALP SERPL-CCNC: 73 U/L (ref 46–116)
ALT SERPL W P-5'-P-CCNC: 25 U/L (ref 12–78)
ANION GAP SERPL CALCULATED.3IONS-SCNC: 9 MMOL/L (ref 4–13)
AST SERPL W P-5'-P-CCNC: 17 U/L (ref 5–45)
ATRIAL RATE: 53 BPM
BACTERIA UR QL AUTO: NORMAL /HPF
BASOPHILS # BLD AUTO: 0.07 THOUSANDS/ΜL (ref 0–0.1)
BASOPHILS NFR BLD AUTO: 1 % (ref 0–1)
BILIRUB SERPL-MCNC: 1.18 MG/DL (ref 0.2–1)
BILIRUB UR QL STRIP: ABNORMAL
BUN SERPL-MCNC: 23 MG/DL (ref 5–25)
CALCIUM ALBUM COR SERPL-MCNC: 9.8 MG/DL (ref 8.3–10.1)
CALCIUM SERPL-MCNC: 9.3 MG/DL (ref 8.3–10.1)
CHLORIDE SERPL-SCNC: 98 MMOL/L (ref 100–108)
CK MB SERPL-MCNC: 1.6 NG/ML (ref 0–5)
CK MB SERPL-MCNC: <1 % (ref 0–2.5)
CK SERPL-CCNC: 274 U/L (ref 39–308)
CLARITY UR: CLEAR
CO2 SERPL-SCNC: 28 MMOL/L (ref 21–32)
COLOR UR: YELLOW
CREAT SERPL-MCNC: 1.11 MG/DL (ref 0.6–1.3)
EOSINOPHIL # BLD AUTO: 0.12 THOUSAND/ΜL (ref 0–0.61)
EOSINOPHIL NFR BLD AUTO: 1 % (ref 0–6)
ERYTHROCYTE [DISTWIDTH] IN BLOOD BY AUTOMATED COUNT: 14.1 % (ref 11.6–15.1)
GFR SERPL CREATININE-BSD FRML MDRD: 69 ML/MIN/1.73SQ M
GLUCOSE SERPL-MCNC: 119 MG/DL (ref 65–140)
GLUCOSE UR STRIP-MCNC: NEGATIVE MG/DL
HCT VFR BLD AUTO: 44 % (ref 36.5–49.3)
HGB BLD-MCNC: 15 G/DL (ref 12–17)
HGB UR QL STRIP.AUTO: ABNORMAL
IMM GRANULOCYTES # BLD AUTO: 0.03 THOUSAND/UL (ref 0–0.2)
IMM GRANULOCYTES NFR BLD AUTO: 0 % (ref 0–2)
KETONES UR STRIP-MCNC: NEGATIVE MG/DL
LACTATE SERPL-SCNC: 0.9 MMOL/L (ref 0.5–2)
LEUKOCYTE ESTERASE UR QL STRIP: NEGATIVE
LYMPHOCYTES # BLD AUTO: 0.78 THOUSANDS/ΜL (ref 0.6–4.47)
LYMPHOCYTES NFR BLD AUTO: 7 % (ref 14–44)
MAGNESIUM SERPL-MCNC: 2.1 MG/DL (ref 1.6–2.6)
MCH RBC QN AUTO: 30.2 PG (ref 26.8–34.3)
MCHC RBC AUTO-ENTMCNC: 34.1 G/DL (ref 31.4–37.4)
MCV RBC AUTO: 89 FL (ref 82–98)
MONOCYTES # BLD AUTO: 1.14 THOUSAND/ΜL (ref 0.17–1.22)
MONOCYTES NFR BLD AUTO: 11 % (ref 4–12)
NEUTROPHILS # BLD AUTO: 8.64 THOUSANDS/ΜL (ref 1.85–7.62)
NEUTS SEG NFR BLD AUTO: 80 % (ref 43–75)
NITRITE UR QL STRIP: NEGATIVE
NON-SQ EPI CELLS URNS QL MICRO: NORMAL /HPF
NRBC BLD AUTO-RTO: 0 /100 WBCS
P AXIS: 49 DEGREES
PH UR STRIP.AUTO: 5.5 [PH]
PHOSPHATE SERPL-MCNC: 3.1 MG/DL (ref 2.3–4.1)
PLATELET # BLD AUTO: 214 THOUSANDS/UL (ref 149–390)
PMV BLD AUTO: 9.5 FL (ref 8.9–12.7)
POTASSIUM SERPL-SCNC: 3.4 MMOL/L (ref 3.5–5.3)
PR INTERVAL: 158 MS
PROT SERPL-MCNC: 7.6 G/DL (ref 6.4–8.2)
PROT UR STRIP-MCNC: NEGATIVE MG/DL
QRS AXIS: 19 DEGREES
QRSD INTERVAL: 88 MS
QT INTERVAL: 436 MS
QTC INTERVAL: 409 MS
RBC # BLD AUTO: 4.97 MILLION/UL (ref 3.88–5.62)
RBC #/AREA URNS AUTO: NORMAL /HPF
SODIUM SERPL-SCNC: 135 MMOL/L (ref 136–145)
SP GR UR STRIP.AUTO: 1.02 (ref 1–1.03)
T WAVE AXIS: 7 DEGREES
TROPONIN I SERPL-MCNC: <0.02 NG/ML
UROBILINOGEN UR QL STRIP.AUTO: 2 E.U./DL
VENTRICULAR RATE: 53 BPM
WBC # BLD AUTO: 10.78 THOUSAND/UL (ref 4.31–10.16)
WBC #/AREA URNS AUTO: NORMAL /HPF

## 2021-07-01 PROCEDURE — 99285 EMERGENCY DEPT VISIT HI MDM: CPT | Performed by: EMERGENCY MEDICINE

## 2021-07-01 PROCEDURE — 82553 CREATINE MB FRACTION: CPT | Performed by: PHYSICIAN ASSISTANT

## 2021-07-01 PROCEDURE — 96360 HYDRATION IV INFUSION INIT: CPT

## 2021-07-01 PROCEDURE — 36415 COLL VENOUS BLD VENIPUNCTURE: CPT | Performed by: PHYSICIAN ASSISTANT

## 2021-07-01 PROCEDURE — 84484 ASSAY OF TROPONIN QUANT: CPT | Performed by: PHYSICIAN ASSISTANT

## 2021-07-01 PROCEDURE — 82550 ASSAY OF CK (CPK): CPT | Performed by: PHYSICIAN ASSISTANT

## 2021-07-01 PROCEDURE — 84100 ASSAY OF PHOSPHORUS: CPT | Performed by: PHYSICIAN ASSISTANT

## 2021-07-01 PROCEDURE — 93005 ELECTROCARDIOGRAM TRACING: CPT

## 2021-07-01 PROCEDURE — 99213 OFFICE O/P EST LOW 20 MIN: CPT | Performed by: PHYSICIAN ASSISTANT

## 2021-07-01 PROCEDURE — 80053 COMPREHEN METABOLIC PANEL: CPT | Performed by: PHYSICIAN ASSISTANT

## 2021-07-01 PROCEDURE — 83605 ASSAY OF LACTIC ACID: CPT | Performed by: PHYSICIAN ASSISTANT

## 2021-07-01 PROCEDURE — 99285 EMERGENCY DEPT VISIT HI MDM: CPT

## 2021-07-01 PROCEDURE — 83735 ASSAY OF MAGNESIUM: CPT | Performed by: PHYSICIAN ASSISTANT

## 2021-07-01 PROCEDURE — G0463 HOSPITAL OUTPT CLINIC VISIT: HCPCS | Performed by: PHYSICIAN ASSISTANT

## 2021-07-01 PROCEDURE — 81001 URINALYSIS AUTO W/SCOPE: CPT | Performed by: PHYSICIAN ASSISTANT

## 2021-07-01 PROCEDURE — 85025 COMPLETE CBC W/AUTO DIFF WBC: CPT | Performed by: PHYSICIAN ASSISTANT

## 2021-07-01 RX ORDER — POTASSIUM CHLORIDE 20 MEQ/1
20 TABLET, EXTENDED RELEASE ORAL ONCE
Status: COMPLETED | OUTPATIENT
Start: 2021-07-01 | End: 2021-07-01

## 2021-07-01 RX ADMIN — SODIUM CHLORIDE 1000 ML: 0.9 INJECTION, SOLUTION INTRAVENOUS at 10:40

## 2021-07-01 RX ADMIN — POTASSIUM CHLORIDE 20 MEQ: 1500 TABLET, EXTENDED RELEASE ORAL at 11:34

## 2021-07-01 NOTE — DISCHARGE INSTRUCTIONS
Please continue to rest and get plenty of water intake and electrolyte drinks  You were found to have a small amount of blood in your urine  Please follow up with you family doctor to have this recheck    If you have any new or worsening symptoms please return to the ED

## 2021-07-01 NOTE — PROGRESS NOTES
[id number]        NAME: Boubacar Albert is a 72 y o  male  : 1955    MRN: [id number]  DATE: 2021  TIME: 9:21 AM    Assessment and Plan   Weakness [R53 1]  1  Weakness  Ambulatory Referral to Emergency Medicine    Transfer to other facility   2  Periumbilical pain  Ambulatory Referral to Emergency Medicine    Transfer to other facility   3  Muscle cramps           Patient Instructions   GO to ER  Follow up with PCP in 3-5 days  Proceed to  ER if symptoms worsen  Chief Complaint     Chief Complaint   Patient presents with    Abdominal Pain     woke up with a Knot in periumbiical area 3 days ago and with weakness  Weakness has gotten worse  No appetite but no nausea  Was out in the heat alot but has been drinking alot of gatorade  Has cramps in legs         History of Present Illness       Patient is a 42-year-old male with significant past medical history of hypertension and hyperlipidemia presents the office complaining of generalized weakness for 4 days  Patient also reports associated loss of appetite, fatigue, mid HA and muscle cramping  Patient reports muscle cramping is so severe that it wakes him up at night  Patient reports  periumbilical pain described as "knot in his stomach"  without associated nausea, vomiting, or change in bowel habits  Last normal bowel movement this morning  Patient reports he has been drinking plenty of fluids but was out in the extreme heat this past weekend  Denies fevers, chills, URI symptoms, chest pain, shortness of breath, numbness and tingling, or unilateral weakness  Denies recent illness  Denies rashes or tick bites  Patient reports he had COVID-19 in 2021  Denies COVID-19 vaccination  Review of Systems   Review of Systems   Constitutional: Positive for appetite change and fatigue  Negative for chills and fever  HENT: Negative for congestion and sore throat  Eyes: Negative for visual disturbance  Respiratory: Negative for cough and shortness of breath  Cardiovascular: Negative for chest pain and palpitations  Gastrointestinal: Positive for abdominal pain  Negative for constipation, diarrhea, nausea and vomiting  Musculoskeletal: Positive for myalgias  Neurological: Positive for weakness and headaches  Negative for dizziness, syncope, speech difficulty, light-headedness and numbness           Current Medications       Current Outpatient Medications:     amLODIPine (NORVASC) 10 mg tablet, , Disp: , Rfl:     aspirin 81 mg chewable tablet, Chew 81 mg daily, Disp: , Rfl:     atorvastatin (LIPITOR) 10 mg tablet, , Disp: , Rfl:     hydrochlorothiazide (HYDRODIURIL) 25 mg tablet, , Disp: , Rfl:     losartan (COZAAR) 50 mg tablet, , Disp: , Rfl:     propranolol (INDERAL LA) 60 mg 24 hr capsule, , Disp: , Rfl:     meloxicam (MOBIC) 15 mg tablet, , Disp: , Rfl:     methocarbamol (ROBAXIN) 750 mg tablet, Take 1 tablet (750 mg total) by mouth every 6 (six) hours as needed for muscle spasms for up to 5 days, Disp: 20 tablet, Rfl: 0    Current Allergies     Allergies as of 07/01/2021 - Reviewed 07/01/2021   Allergen Reaction Noted    Adhesive [medical tape] Rash 03/12/2020    Other Rash 03/16/2015            The following portions of the patient's history were reviewed and updated as appropriate: allergies, current medications, past family history, past medical history, past social history, past surgical history and problem list      Past Medical History:   Diagnosis Date    Ankle injury     Arthritis     High cholesterol     Hypertension        Past Surgical History:   Procedure Laterality Date    ADENOIDECTOMY      CARPAL TUNNEL RELEASE Bilateral     JOINT REPLACEMENT      MENISCECTOMY Bilateral     REPLACEMENT TOTAL KNEE Left     TONSILLECTOMY         Family History   Problem Relation Age of Onset    Heart disease Mother     Arthritis Mother     Heart disease Father          Medications have been verified  Objective   /71   Pulse 62   Temp 98 °F (36 7 °C)   Resp 16   Ht 5' 11" (1 803 m)   Wt 99 8 kg (220 lb)   SpO2 98%   BMI 30 68 kg/m²   No LMP for male patient  Physical Exam     Physical Exam  Vitals and nursing note reviewed  Constitutional:       Appearance: Normal appearance  He is well-developed  HENT:      Head: Normocephalic and atraumatic  Right Ear: Tympanic membrane, ear canal and external ear normal       Left Ear: Tympanic membrane, ear canal and external ear normal       Nose: Nose normal       Mouth/Throat:      Mouth: Mucous membranes are moist       Pharynx: Oropharynx is clear  Uvula midline  Eyes:      General: Lids are normal       Extraocular Movements: Extraocular movements intact  Conjunctiva/sclera: Conjunctivae normal       Pupils: Pupils are equal, round, and reactive to light  Cardiovascular:      Rate and Rhythm: Normal rate and regular rhythm  Heart sounds: Normal heart sounds  No murmur heard  No friction rub  No gallop  Pulmonary:      Effort: Pulmonary effort is normal       Breath sounds: Normal breath sounds  No stridor  No wheezing or rales  Abdominal:      General: Bowel sounds are normal       Palpations: Abdomen is soft  Tenderness: There is no abdominal tenderness  Musculoskeletal:         General: Normal range of motion  Cervical back: Neck supple  Skin:     General: Skin is warm and dry  Capillary Refill: Capillary refill takes less than 2 seconds  Neurological:      General: No focal deficit present  Mental Status: He is alert  GCS: GCS eye subscore is 4  GCS verbal subscore is 5  GCS motor subscore is 6  Cranial Nerves: Cranial nerves are intact  Sensory: Sensation is intact  Motor: Motor function is intact  Coordination: Coordination is intact  Gait: Gait is intact

## 2021-07-01 NOTE — ED PROVIDER NOTES
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  Denies any dysuria, hematuria, urinary frequency  States decreased and dark urine  State he has been drinking Gatorade and water  History provided by:  Patient  Fatigue  Severity:  Mild  Onset quality:  Gradual  Timing:  Constant  Progression:  Unchanged  Chronicity:  New  Context: dehydration    Relieved by:  None tried  Worsened by: Activity  Ineffective treatments:  None tried  Associated symptoms: myalgias    Associated symptoms: no abdominal pain, no anorexia, no aphasia, no arthralgias, no ataxia, no chest pain, no cough, no diarrhea, no difficulty walking, no dizziness, no drooling, no dysphagia, no dysuria, no numbness in extremities, no falls, no fever, no foul-smelling urine, no frequency, no headaches, no hematochezia, no lethargy, no loss of consciousness, no melena, no nausea, no near-syncope, no seizures, no sensory-motor deficit, no shortness of breath, no stroke symptoms, no syncope, no urgency, no vision change and no vomiting        Prior to Admission Medications   Prescriptions Last Dose Informant Patient Reported? Taking?    amLODIPine (NORVASC) 10 mg tablet   Yes No   aspirin 81 mg chewable tablet   Yes No   Sig: Chew 81 mg daily   atorvastatin (LIPITOR) 10 mg tablet   Yes No   hydrochlorothiazide (HYDRODIURIL) 25 mg tablet   Yes No   losartan (COZAAR) 50 mg tablet Yes No   meloxicam (MOBIC) 15 mg tablet   Yes No   methocarbamol (ROBAXIN) 750 mg tablet   No No   Sig: Take 1 tablet (750 mg total) by mouth every 6 (six) hours as needed for muscle spasms for up to 5 days   propranolol (INDERAL LA) 60 mg 24 hr capsule   Yes No      Facility-Administered Medications: None       Past Medical History:   Diagnosis Date    Ankle injury     Arthritis     High cholesterol     Hypertension        Past Surgical History:   Procedure Laterality Date    ADENOIDECTOMY      CARPAL TUNNEL RELEASE Bilateral     JOINT REPLACEMENT      MENISCECTOMY Bilateral     REPLACEMENT TOTAL KNEE Left     TONSILLECTOMY         Family History   Problem Relation Age of Onset    Heart disease Mother     Arthritis Mother     Heart disease Father      I have reviewed and agree with the history as documented  E-Cigarette/Vaping    E-Cigarette Use Never User      E-Cigarette/Vaping Substances    Nicotine No     THC No     CBD No     Flavoring No     Other No     Unknown No      Social History     Tobacco Use    Smoking status: Never Smoker    Smokeless tobacco: Current User     Types: Chew   Vaping Use    Vaping Use: Never used   Substance Use Topics    Alcohol use: Never    Drug use: Never       Review of Systems   Constitutional: Positive for appetite change (decreased) and fatigue  Negative for chills, diaphoresis and fever  HENT: Negative  Negative for drooling  Eyes: Negative for visual disturbance  Respiratory: Negative  Negative for cough and shortness of breath  Cardiovascular: Negative  Negative for chest pain, syncope and near-syncope  Gastrointestinal: Negative  Negative for abdominal pain, anorexia, diarrhea, dysphagia, hematochezia, melena, nausea and vomiting  Genitourinary: Negative for dysuria, frequency and urgency  Dark urine     Musculoskeletal: Positive for myalgias  Negative for arthralgias and falls  Skin: Negative      Neurological: Positive for weakness  Negative for dizziness, seizures, loss of consciousness and headaches  All other systems reviewed and are negative  Physical Exam  Physical Exam  Vitals and nursing note reviewed  Constitutional:       General: He is not in acute distress  Appearance: Normal appearance  He is normal weight  He is not ill-appearing, toxic-appearing or diaphoretic  HENT:      Head: Normocephalic and atraumatic  Nose: Nose normal  No congestion or rhinorrhea  Mouth/Throat:      Mouth: Mucous membranes are moist       Pharynx: Oropharynx is clear  No oropharyngeal exudate or posterior oropharyngeal erythema  Eyes:      Extraocular Movements: Extraocular movements intact  Conjunctiva/sclera: Conjunctivae normal       Pupils: Pupils are equal, round, and reactive to light  Cardiovascular:      Rate and Rhythm: Normal rate and regular rhythm  Pulmonary:      Effort: Pulmonary effort is normal  No respiratory distress  Breath sounds: Normal breath sounds  No stridor  No wheezing, rhonchi or rales  Chest:      Chest wall: No tenderness  Abdominal:      General: Abdomen is flat  Bowel sounds are normal  There is no distension  Palpations: Abdomen is soft  Tenderness: There is no abdominal tenderness  There is no right CVA tenderness, left CVA tenderness or guarding  Musculoskeletal:         General: No swelling, tenderness, deformity or signs of injury  Normal range of motion  Cervical back: Normal range of motion  Skin:     General: Skin is warm and dry  Capillary Refill: Capillary refill takes less than 2 seconds  Coloration: Skin is not jaundiced or pale  Findings: No bruising, erythema or rash  Neurological:      General: No focal deficit present  Mental Status: He is alert and oriented to person, place, and time  Sensory: No sensory deficit  Motor: No weakness     Psychiatric:         Mood and Affect: Mood normal  Behavior: Behavior normal          Thought Content:  Thought content normal          Judgment: Judgment normal          Vital Signs  ED Triage Vitals [07/01/21 1010]   Temperature Pulse Respirations Blood Pressure SpO2   97 9 °F (36 6 °C) (!) 53 17 145/70 96 %      Temp Source Heart Rate Source Patient Position - Orthostatic VS BP Location FiO2 (%)   Temporal Monitor Lying Right arm --      Pain Score       No Pain           Vitals:    07/01/21 1010 07/01/21 1015 07/01/21 1130 07/01/21 1200   BP: 145/70 143/68 127/62 145/72   Pulse: (!) 53 (!) 53 (!) 52 (!) 53   Patient Position - Orthostatic VS: Lying Lying Lying Sitting         Visual Acuity      ED Medications  Medications   sodium chloride 0 9 % bolus 1,000 mL (0 mL Intravenous Stopped 7/1/21 1140)   potassium chloride (K-DUR,KLOR-CON) CR tablet 20 mEq (20 mEq Oral Given 7/1/21 1134)       Diagnostic Studies  Results Reviewed     Procedure Component Value Units Date/Time    Urine Microscopic [[id number]]  (Normal) Collected: 07/01/21 1136    Lab Status: Final result Specimen: Urine, Clean Catch Updated: 07/01/21 1159     RBC, UA 0-1 /hpf      WBC, UA None Seen /hpf      Epithelial Cells None Seen /hpf      Bacteria, UA None Seen /hpf     UA w Reflex to Microscopic w Reflex to Culture [[id number]]  (Abnormal) Collected: 07/01/21 1136    Lab Status: Final result Specimen: Urine, Clean Catch Updated: 07/01/21 1150     Color, UA Yellow     Clarity, UA Clear     Specific Gravity, UA 1 025     pH, UA 5 5     Leukocytes, UA Negative     Nitrite, UA Negative     Protein, UA Negative mg/dl      Glucose, UA Negative mg/dl      Ketones, UA Negative mg/dl      Urobilinogen, UA 2 0 E U /dl      Bilirubin, UA Small     Blood, UA Trace-Intact    CKMB [[id number]]  (Normal) Collected: 07/01/21 1040    Lab Status: Final result Specimen: Blood from Arm, Left Updated: 07/01/21 1142     CK-MB Index <1 0 %      CK-MB 1 6 ng/mL     Lactic acid [[id number]]  (Normal) Collected: 07/01/21 1040    Lab Status: Final result Specimen: Blood from Arm, Left Updated: 07/01/21 1109     LACTIC ACID 0 9 mmol/L     Narrative:      Result may be elevated if tourniquet was used during collection      Troponin I [[id number]]  (Normal) Collected: 07/01/21 1040    Lab Status: Final result Specimen: Blood from Arm, Left Updated: 07/01/21 1109     Troponin I <0 02 ng/mL     Comprehensive metabolic panel [[id number]]  (Abnormal) Collected: 07/01/21 1040    Lab Status: Final result Specimen: Blood from Arm, Left Updated: 07/01/21 1108     Sodium 135 mmol/L      Potassium 3 4 mmol/L      Chloride 98 mmol/L      CO2 28 mmol/L      ANION GAP 9 mmol/L      BUN 23 mg/dL      Creatinine 1 11 mg/dL      Glucose 119 mg/dL      Calcium 9 3 mg/dL      Corrected Calcium 9 8 mg/dL      AST 17 U/L      ALT 25 U/L      Alkaline Phosphatase 73 U/L      Total Protein 7 6 g/dL      Albumin 3 4 g/dL      Total Bilirubin 1 18 mg/dL      eGFR 69 ml/min/1 73sq m     Narrative:      [place] guidelines for Chronic Kidney Disease (CKD):     Stage 1 with normal or high GFR (GFR > 90 mL/min/1 73 square meters)    Stage 2 Mild CKD (GFR = 60-89 mL/min/1 73 square meters)    Stage 3A Moderate CKD (GFR = 45-59 mL/min/1 73 square meters)    Stage 3B Moderate CKD (GFR = 30-44 mL/min/1 73 square meters)    Stage 4 Severe CKD (GFR = 15-29 mL/min/1 73 square meters)    Stage 5 End Stage CKD (GFR <15 mL/min/1 73 square meters)  Note: GFR calculation is accurate only with a steady state creatinine    Phosphorus [[id number]]  (Normal) Collected: 07/01/21 1040    Lab Status: Final result Specimen: Blood from Arm, Left Updated: 07/01/21 1108     Phosphorus 3 1 mg/dL     Magnesium [[id number]]  (Normal) Collected: 07/01/21 1040    Lab Status: Final result Specimen: Blood from Arm, Left Updated: 07/01/21 1108     Magnesium 2 1 mg/dL     CK Total with Reflex CKMB [[id number]]  (Normal) Collected: 07/01/21 1040    Lab Status: Final result Specimen: Blood from Arm, Left Updated: 07/01/21 1103     Total  U/L     CBC and differential [[id number]]  (Abnormal) Collected: 07/01/21 1040    Lab Status: Final result Specimen: Blood from Arm, Left Updated: 07/01/21 1049     WBC 10 78 Thousand/uL      RBC 4 97 Million/uL      Hemoglobin 15 0 g/dL      Hematocrit 44 0 %      MCV 89 fL      MCH 30 2 pg      MCHC 34 1 g/dL      RDW 14 1 %      MPV 9 5 fL      Platelets 878 Thousands/uL      nRBC 0 /100 WBCs      Neutrophils Relative 80 %      Immat GRANS % 0 %      Lymphocytes Relative 7 %      Monocytes Relative 11 %      Eosinophils Relative 1 %      Basophils Relative 1 %      Neutrophils Absolute 8 64 Thousands/µL      Immature Grans Absolute 0 03 Thousand/uL      Lymphocytes Absolute 0 78 Thousands/µL      Monocytes Absolute 1 14 Thousand/µL      Eosinophils Absolute 0 12 Thousand/µL      Basophils Absolute 0 07 Thousands/µL                  No orders to display              Procedures  ECG 12 Lead Documentation Only    Date/Time: 7/1/2021 10:25 AM  Performed by: Howie Landers PA-C  Authorized by: Howie Landers PA-C     Indications / Diagnosis:  Weakness  ECG reviewed by me, the ED Provider: yes    Patient location:  ED  Interpretation:     Interpretation: non-specific    Rate:     ECG rate:  53    ECG rate assessment: bradycardic    Rhythm:     Rhythm: sinus bradycardia    Ectopy:     Ectopy: none    QRS:     QRS axis:  Normal    QRS intervals:  Normal  Conduction:     Conduction: normal    ST segments:     ST segments:  Normal  T waves:     T waves: normal               ED Course  ED Course as of Jul 01 1359   Thu Jul 01, 2021   1140 Patient's urine found have small blood and small bilirubin discussed these findings with the patient he will follow up with his family doctor at this rechecked in 1 week  Patient states he is feeling significantly improved status post fluids    Discussed the importance of hydration and rest over the next several days  Patient was educated on symptoms that require prompt return to the emergency department for further evaluation and verbalized understanding  1147 Discussed all results and findings with patient thus far  Normal hyponatremia should be corrected with fluids provided in the emergency department  Potassium was minimally low which was repleted orally  Patient is resting comfortably with no complaints at this time                    MDM  Number of Diagnoses or Management Options  Dehydration: new and requires workup  Generalized weakness: new and requires workup  Hematuria: new and requires workup  Hypokalemia: new and requires workup  Hyponatremia: new and requires workup  Diagnosis management comments: 72year old male presented to the ED for evaluation of generalized weakness and muscle cramps  Vitals and medical record reviewed  Physical exam was relatively unremarkable  Patient is tolerating p o  Intake  Laboratory findings significant for mild hyponatremia hypokalemia  These were repleted in the ED  No sign of rhabdo  Troponin negative  EKG nonischemic  Patient found have small amount of hematuria will follow-up with family doctor  Patient felt significantly improved status post fluids  Resting comfortably without complaint  We discussed the importance of hydration and rest   Will follow-up with family doctor  Strict return precautions were discussed and patient verbalized understanding         Amount and/or Complexity of Data Reviewed  Clinical lab tests: ordered and reviewed  Review and summarize past medical records: yes        Disposition  Final diagnoses:   Generalized weakness   Hypokalemia   Hyponatremia   Hematuria   Dehydration     Time reflects when diagnosis was documented in both MDM as applicable and the Disposition within this note     Time User Action Codes Description Comment    7/1/2021 12:20 PM Estela Steele Add [R53 1] Generalized weakness     7/1/2021 12:20 PM Elbert November, Angélica Add [E87 6] Hypokalemia     7/1/2021 12:20 PM Alonso Argyle Add [E87 1] Hyponatremia     7/1/2021 12:21 PM Alonso Arvind Add [R31 9] Hematuria     7/1/2021 12:21 PM Alonso Arvind Add [E86 0] Dehydration       ED Disposition     ED Disposition Condition Date/Time Comment    Discharge Stable Thu Jul 1, 2021 12:20 PM Chyna Dines discharge to home/self care  Follow-up Information     Follow up With Specialties Details Why Contact Info    Lesia Chen DO Family Medicine In 1 week  [address] [email or web address] [address] [phone]            Discharge Medication List as of 7/1/2021 12:26 PM      CONTINUE these medications which have NOT CHANGED    Details   amLODIPine (NORVASC) 10 mg tablet Starting Sat 3/7/2020, Historical Med      aspirin 81 mg chewable tablet Chew 81 mg daily, Historical Med      atorvastatin (LIPITOR) 10 mg tablet Starting Fri 1/17/2020, Historical Med      hydrochlorothiazide (HYDRODIURIL) 25 mg tablet Starting Sat 3/7/2020, Historical Med      losartan (COZAAR) 50 mg tablet Starting Fri 1/17/2020, Historical Med      meloxicam (MOBIC) 15 mg tablet Starting Sat 3/7/2020, Historical Med      methocarbamol (ROBAXIN) 750 mg tablet Take 1 tablet (750 mg total) by mouth every 6 (six) hours as needed for muscle spasms for up to 5 days, Starting Wed 3/25/2020, Until Mon 3/30/2020, Normal      propranolol (INDERAL LA) 60 mg 24 hr capsule Starting Sat 3/7/2020, Historical Med           No discharge procedures on file      PDMP Review     None          ED Provider  Electronically Signed by           Argentina Becker PA-C  07/01/21 7462

## 2021-07-05 ENCOUNTER — HOSPITAL ENCOUNTER (EMERGENCY)
Facility: HOSPITAL | Age: 66
Discharge: HOME | End: 2021-07-05
Attending: EMERGENCY MEDICINE | Admitting: EMERGENCY MEDICINE
Payer: MEDICARE

## 2021-07-05 ENCOUNTER — APPOINTMENT (EMERGENCY)
Dept: CT IMAGING | Facility: HOSPITAL | Age: 66
End: 2021-07-05
Payer: MEDICARE

## 2021-07-05 VITALS
RESPIRATION RATE: 21 BRPM | SYSTOLIC BLOOD PRESSURE: 133 MMHG | WEIGHT: 225.97 LBS | HEART RATE: 46 BPM | BODY MASS INDEX: 31.64 KG/M2 | OXYGEN SATURATION: 94 % | HEIGHT: 71 IN | DIASTOLIC BLOOD PRESSURE: 62 MMHG | TEMPERATURE: 97.3 F

## 2021-07-05 DIAGNOSIS — R53.1 WEAKNESS: Primary | ICD-10-CM

## 2021-07-05 LAB
ALBUMIN SERPL BCP-MCNC: 3 G/DL (ref 3.5–5)
ALP SERPL-CCNC: 59 U/L (ref 46–116)
ALT SERPL W P-5'-P-CCNC: 35 U/L (ref 12–78)
ANION GAP SERPL CALCULATED.3IONS-SCNC: 7 MMOL/L (ref 4–13)
AST SERPL W P-5'-P-CCNC: 17 U/L (ref 5–45)
BASOPHILS # BLD AUTO: 0.09 THOUSANDS/ΜL (ref 0–0.1)
BASOPHILS NFR BLD AUTO: 1 % (ref 0–1)
BILIRUB SERPL-MCNC: 0.75 MG/DL (ref 0.2–1)
BILIRUB UR QL STRIP: ABNORMAL
BUN SERPL-MCNC: 15 MG/DL (ref 5–25)
CALCIUM ALBUM COR SERPL-MCNC: 10.4 MG/DL (ref 8.3–10.1)
CALCIUM SERPL-MCNC: 9.6 MG/DL (ref 8.3–10.1)
CHLORIDE SERPL-SCNC: 99 MMOL/L (ref 100–108)
CK SERPL-CCNC: 100 U/L (ref 39–308)
CLARITY UR: CLEAR
CO2 SERPL-SCNC: 30 MMOL/L (ref 21–32)
COLOR UR: YELLOW
CREAT SERPL-MCNC: 1.05 MG/DL (ref 0.6–1.3)
EOSINOPHIL # BLD AUTO: 0.21 THOUSAND/ΜL (ref 0–0.61)
EOSINOPHIL NFR BLD AUTO: 2 % (ref 0–6)
ERYTHROCYTE [DISTWIDTH] IN BLOOD BY AUTOMATED COUNT: 13.7 % (ref 11.6–15.1)
GFR SERPL CREATININE-BSD FRML MDRD: 74 ML/MIN/1.73SQ M
GLUCOSE SERPL-MCNC: 184 MG/DL (ref 65–140)
GLUCOSE UR STRIP-MCNC: NEGATIVE MG/DL
HCT VFR BLD AUTO: 42.6 % (ref 36.5–49.3)
HGB BLD-MCNC: 14.1 G/DL (ref 12–17)
HGB UR QL STRIP.AUTO: NEGATIVE
IMM GRANULOCYTES # BLD AUTO: 0.06 THOUSAND/UL (ref 0–0.2)
IMM GRANULOCYTES NFR BLD AUTO: 1 % (ref 0–2)
KETONES UR STRIP-MCNC: NEGATIVE MG/DL
LACTATE SERPL-SCNC: 1.6 MMOL/L (ref 0.5–2)
LEUKOCYTE ESTERASE UR QL STRIP: NEGATIVE
LIPASE SERPL-CCNC: 83 U/L (ref 73–393)
LYMPHOCYTES # BLD AUTO: 0.88 THOUSANDS/ΜL (ref 0.6–4.47)
LYMPHOCYTES NFR BLD AUTO: 8 % (ref 14–44)
MCH RBC QN AUTO: 30 PG (ref 26.8–34.3)
MCHC RBC AUTO-ENTMCNC: 33.1 G/DL (ref 31.4–37.4)
MCV RBC AUTO: 91 FL (ref 82–98)
MONOCYTES # BLD AUTO: 0.75 THOUSAND/ΜL (ref 0.17–1.22)
MONOCYTES NFR BLD AUTO: 7 % (ref 4–12)
NEUTROPHILS # BLD AUTO: 8.43 THOUSANDS/ΜL (ref 1.85–7.62)
NEUTS SEG NFR BLD AUTO: 81 % (ref 43–75)
NITRITE UR QL STRIP: NEGATIVE
NRBC BLD AUTO-RTO: 0 /100 WBCS
PH UR STRIP.AUTO: 5.5 [PH]
PLATELET # BLD AUTO: 306 THOUSANDS/UL (ref 149–390)
PMV BLD AUTO: 9.6 FL (ref 8.9–12.7)
POTASSIUM SERPL-SCNC: 3.7 MMOL/L (ref 3.5–5.3)
PROT SERPL-MCNC: 7.4 G/DL (ref 6.4–8.2)
PROT UR STRIP-MCNC: NEGATIVE MG/DL
RBC # BLD AUTO: 4.7 MILLION/UL (ref 3.88–5.62)
SARS-COV-2 RNA RESP QL NAA+PROBE: NEGATIVE
SODIUM SERPL-SCNC: 136 MMOL/L (ref 136–145)
SP GR UR STRIP.AUTO: 1.02 (ref 1–1.03)
TROPONIN I SERPL-MCNC: <0.02 NG/ML
TSH SERPL DL<=0.05 MIU/L-ACNC: 1.25 UIU/ML (ref 0.36–3.74)
UROBILINOGEN UR QL STRIP.AUTO: 1 E.U./DL
WBC # BLD AUTO: 10.42 THOUSAND/UL (ref 4.31–10.16)

## 2021-07-05 PROCEDURE — 93005 ELECTROCARDIOGRAM TRACING: CPT

## 2021-07-05 PROCEDURE — 96361 HYDRATE IV INFUSION ADD-ON: CPT

## 2021-07-05 PROCEDURE — U0003 INFECTIOUS AGENT DETECTION BY NUCLEIC ACID (DNA OR RNA); SEVERE ACUTE RESPIRATORY SYNDROME CORONAVIRUS 2 (SARS-COV-2) (CORONAVIRUS DISEASE [COVID-19]), AMPLIFIED PROBE TECHNIQUE, MAKING USE OF HIGH THROUGHPUT TECHNOLOGIES AS DESCRIBED BY CMS-2020-01-R: HCPCS | Performed by: EMERGENCY MEDICINE

## 2021-07-05 PROCEDURE — 80053 COMPREHEN METABOLIC PANEL: CPT | Performed by: EMERGENCY MEDICINE

## 2021-07-05 PROCEDURE — 96374 THER/PROPH/DIAG INJ IV PUSH: CPT

## 2021-07-05 PROCEDURE — 84484 ASSAY OF TROPONIN QUANT: CPT | Performed by: EMERGENCY MEDICINE

## 2021-07-05 PROCEDURE — 84443 ASSAY THYROID STIM HORMONE: CPT | Performed by: EMERGENCY MEDICINE

## 2021-07-05 PROCEDURE — 82550 ASSAY OF CK (CPK): CPT | Performed by: EMERGENCY MEDICINE

## 2021-07-05 PROCEDURE — 36415 COLL VENOUS BLD VENIPUNCTURE: CPT | Performed by: EMERGENCY MEDICINE

## 2021-07-05 PROCEDURE — 83605 ASSAY OF LACTIC ACID: CPT | Performed by: EMERGENCY MEDICINE

## 2021-07-05 PROCEDURE — 81003 URINALYSIS AUTO W/O SCOPE: CPT | Performed by: EMERGENCY MEDICINE

## 2021-07-05 PROCEDURE — 99285 EMERGENCY DEPT VISIT HI MDM: CPT | Performed by: EMERGENCY MEDICINE

## 2021-07-05 PROCEDURE — 99285 EMERGENCY DEPT VISIT HI MDM: CPT

## 2021-07-05 PROCEDURE — U0005 INFEC AGEN DETEC AMPLI PROBE: HCPCS | Performed by: EMERGENCY MEDICINE

## 2021-07-05 PROCEDURE — 83690 ASSAY OF LIPASE: CPT | Performed by: EMERGENCY MEDICINE

## 2021-07-05 PROCEDURE — 74176 CT ABD & PELVIS W/O CONTRAST: CPT

## 2021-07-05 PROCEDURE — 85025 COMPLETE CBC W/AUTO DIFF WBC: CPT | Performed by: EMERGENCY MEDICINE

## 2021-07-05 PROCEDURE — 70450 CT HEAD/BRAIN W/O DYE: CPT

## 2021-07-05 RX ORDER — KETOROLAC TROMETHAMINE 30 MG/ML
15 INJECTION, SOLUTION INTRAMUSCULAR; INTRAVENOUS ONCE
Status: COMPLETED | OUTPATIENT
Start: 2021-07-05 | End: 2021-07-05

## 2021-07-05 RX ADMIN — SODIUM CHLORIDE 1000 ML: 0.9 INJECTION, SOLUTION INTRAVENOUS at 09:20

## 2021-07-05 RX ADMIN — KETOROLAC TROMETHAMINE 15 MG: 30 INJECTION, SOLUTION INTRAMUSCULAR at 09:20

## 2021-07-06 LAB
ATRIAL RATE: 60 BPM
P AXIS: 69 DEGREES
PR INTERVAL: 154 MS
QRS AXIS: 61 DEGREES
QRSD INTERVAL: 96 MS
QT INTERVAL: 414 MS
QTC INTERVAL: 414 MS
T WAVE AXIS: 22 DEGREES
VENTRICULAR RATE: 60 BPM

## 2021-09-06 ENCOUNTER — OFFICE VISIT (OUTPATIENT)
Dept: URGENT CARE | Facility: CLINIC | Age: 66
End: 2021-09-06
Payer: MEDICARE

## 2021-09-06 ENCOUNTER — APPOINTMENT (OUTPATIENT)
Dept: RADIOLOGY | Facility: CLINIC | Age: 66
End: 2021-09-06
Payer: MEDICARE

## 2021-09-06 VITALS
WEIGHT: 225 LBS | HEART RATE: 52 BPM | TEMPERATURE: 97.1 F | OXYGEN SATURATION: 98 % | BODY MASS INDEX: 32.21 KG/M2 | DIASTOLIC BLOOD PRESSURE: 75 MMHG | RESPIRATION RATE: 18 BRPM | SYSTOLIC BLOOD PRESSURE: 163 MMHG | HEIGHT: 70 IN

## 2021-09-06 DIAGNOSIS — M25.531 WRIST PAIN, ACUTE, RIGHT: ICD-10-CM

## 2021-09-06 DIAGNOSIS — M25.531 WRIST PAIN, ACUTE, RIGHT: Primary | ICD-10-CM

## 2021-09-06 PROCEDURE — 99212 OFFICE O/P EST SF 10 MIN: CPT | Performed by: EMERGENCY MEDICINE

## 2021-09-06 PROCEDURE — G0463 HOSPITAL OUTPT CLINIC VISIT: HCPCS | Performed by: EMERGENCY MEDICINE

## 2021-09-06 PROCEDURE — 73110 X-RAY EXAM OF WRIST: CPT

## 2021-09-06 RX ORDER — PREDNISONE 10 MG/1
TABLET ORAL
Qty: 27 TABLET | Refills: 0 | Status: SHIPPED | OUTPATIENT
Start: 2021-09-06

## 2021-09-06 NOTE — PROGRESS NOTES
[address]        NAME: Indira Concepcion is a 72 y o  male  : 1955    MRN: [id number]  DATE: 2021  TIME: 8:45 AM    Assessment and Plan   Wrist pain, acute, right [M25 531]  1  Wrist pain, acute, right  XR wrist 3+ vw right    predniSONE 10 mg tablet    Ambulatory referral to Physical Therapy     Static splint cock-up type applied to patient's right wrist by RN  Patient Instructions     Patient Instructions   If you are diabetic you should adhere strictly to your diabetic diet and monitor blood sugar closely while on prednisone and you should discontinue the prednisone if blood sugar becomes significantly elevated  Avoid nonsteroidal anti-inflammatories like Advil or Aleve while on prednisone  Tendinitis   WHAT YOU NEED TO KNOW:   Tendinitis is painful inflammation or breakdown of your tendons  It may also be called tendinopathy  Tendinitis often occurs in the knee, shoulder, ankle, hip, or elbow  DISCHARGE INSTRUCTIONS:   Medicines:   · Pain medicines  such as acetaminophen or NSAIDs may decrease swelling and pain or fever  These medicines are available without a doctor's order  Ask which medicine to take  Ask how much to take and when to take it  Follow directions  Acetaminophen and NSAIDs can cause liver or kidney damage if not taken correctly  If you take blood thinner medicine, always ask your healthcare provider if NSAIDs are safe for you  Always read the medicine label and follow the directions on it before using these medicine  · Take your medicine as directed  Contact your healthcare provider if you think your medicine is not helping or if you have side effects  Tell him if you are allergic to any medicine  Keep a list of the medicines, vitamins, and herbs you take  Include the amounts, and when and why you take them  Bring the list or the pill bottles to follow-up visits  Carry your medicine list with you in case of an emergency      Management:   · Rest  your tendon as directed to help it heal  Ask your healthcare provider if you need to stop putting weight on your affected area  · Ice  helps decrease swelling and pain  Ice may also help prevent tissue damage  Use an ice pack, or put crushed ice in a plastic bag  Cover it with a towel and place it on the affected area for 10 to 15 minutes every hour or as directed  · Support devices  such as a cane, splint, shoe insert, or brace may help reduce your pain  · Physical therapy  may be ordered by your healthcare provider  This may be used to teach you exercises to help improve movement and strength, and to decrease pain  You may also learn how to improve your posture, and how to lift or exercise correctly  Prevention:   · Stretch and warm up  before you exercise  · Exercise regularly  to strengthen the muscles around your joint  Ease into an exercise routine for the first 3 weeks to prevent another injury  Ask your healthcare provider about the best exercise plan for you  Rest fully between activities  · Use the right equipment  for sports and exercise  Wear braces or tape around weak joints as directed  Follow up with your healthcare provider as directed:  Write down your questions so you remember to ask them during your visits  Contact your healthcare provider if:   · You have increased pain even after you take medicine  · You have questions or concerns about your condition or care  Return to the emergency department if:   · You have increased redness over the joint, or swelling in the joint  · You suddenly cannot move your joint  © Copyright [employer] 2021 Information is for End User's use only and may not be sold, redistributed or otherwise used for commercial purposes  All illustrations and images included in CareNotes® are the copyrighted property of A [employer] , Inc  or [employer]  The above information is an  only   It is not intended as medical advice for individual conditions or treatments  Talk to your doctor, nurse or pharmacist before following any medical regimen to see if it is safe and effective for you  Follow up with PCP in 3-5 days  Proceed to  ER if symptoms worsen  Chief Complaint     Chief Complaint   Patient presents with    Wrist Pain     Pt states no trauma to it  Lasting about a week  R Wrist         History of Present Illness       Patient complains of gradual onset pain right wrist dorsum for the past 1 week  He denies trauma  He is right-hand dominant  He denies similar symptoms in the past       Review of Systems   Review of Systems   Constitutional: Negative for chills and fever  Musculoskeletal: Positive for myalgias  Negative for arthralgias, gait problem and joint swelling  Skin: Negative for color change, rash and wound  Neurological: Negative for numbness           Current Medications       Current Outpatient Medications:     amLODIPine (NORVASC) 10 mg tablet, 25 mg , Disp: , Rfl:     aspirin 81 mg chewable tablet, Chew 81 mg daily, Disp: , Rfl:     atorvastatin (LIPITOR) 10 mg tablet, , Disp: , Rfl:     hydrochlorothiazide (HYDRODIURIL) 25 mg tablet, , Disp: , Rfl:     losartan (COZAAR) 50 mg tablet, , Disp: , Rfl:     propranolol (INDERAL LA) 60 mg 24 hr capsule, , Disp: , Rfl:     meloxicam (MOBIC) 15 mg tablet, , Disp: , Rfl:     methocarbamol (ROBAXIN) 750 mg tablet, Take 1 tablet (750 mg total) by mouth every 6 (six) hours as needed for muscle spasms for up to 5 days, Disp: 20 tablet, Rfl: 0    predniSONE 10 mg tablet, Take once daily all days pills on this schedule 6- 6- 5- 4- 3- 2- 1, Disp: 27 tablet, Rfl: 0    Current Allergies     Allergies as of 09/06/2021 - Reviewed 09/06/2021   Allergen Reaction Noted    Adhesive [medical tape] Rash 03/12/2020    Other Rash 03/16/2015            The following portions of the patient's history were reviewed and updated as appropriate: allergies, current medications, past family history, past medical history, past social history, past surgical history and problem list      Past Medical History:   Diagnosis Date    Ankle injury     Arthritis     High cholesterol     Hypertension        Past Surgical History:   Procedure Laterality Date    ADENOIDECTOMY      CARPAL TUNNEL RELEASE Bilateral     JOINT REPLACEMENT      MENISCECTOMY Bilateral     REPLACEMENT TOTAL KNEE Left     TONSILLECTOMY         Family History   Problem Relation Age of Onset    Heart disease Mother     Arthritis Mother     Heart disease Father          Medications have been verified  Objective   /75   Pulse (!) 52   Temp (!) 97 1 °F (36 2 °C)   Resp 18   Ht 5' 10" (1 778 m)   Wt 102 kg (225 lb)   SpO2 98%   BMI 32 28 kg/m²        Physical Exam     Physical Exam  Vitals and nursing note reviewed  Constitutional:       General: He is not in acute distress  Appearance: He is well-developed  Cardiovascular:      Rate and Rhythm: Normal rate and regular rhythm  Pulmonary:      Effort: Pulmonary effort is normal       Breath sounds: Normal breath sounds  Musculoskeletal:         General: Tenderness present  No deformity  Cervical back: Neck supple  Comments: Tender right wrist dorsum over extensor tendon, positive Finkelstein test    Skin:     General: Skin is warm and dry  Findings: No erythema or rash  Neurological:      Mental Status: He is alert and oriented to person, place, and time  Deep Tendon Reflexes: Reflexes are normal and symmetric  Psychiatric:         Mood and Affect: Mood normal          Behavior: Behavior normal          Thought Content:  Thought content normal          Judgment: Judgment normal

## 2021-09-06 NOTE — PATIENT INSTRUCTIONS
If you are diabetic you should adhere strictly to your diabetic diet and monitor blood sugar closely while on prednisone and you should discontinue the prednisone if blood sugar becomes significantly elevated  Avoid nonsteroidal anti-inflammatories like Advil or Aleve while on prednisone  Tendinitis   WHAT YOU NEED TO KNOW:   Tendinitis is painful inflammation or breakdown of your tendons  It may also be called tendinopathy  Tendinitis often occurs in the knee, shoulder, ankle, hip, or elbow  DISCHARGE INSTRUCTIONS:   Medicines:   · Pain medicines  such as acetaminophen or NSAIDs may decrease swelling and pain or fever  These medicines are available without a doctor's order  Ask which medicine to take  Ask how much to take and when to take it  Follow directions  Acetaminophen and NSAIDs can cause liver or kidney damage if not taken correctly  If you take blood thinner medicine, always ask your healthcare provider if NSAIDs are safe for you  Always read the medicine label and follow the directions on it before using these medicine  · Take your medicine as directed  Contact your healthcare provider if you think your medicine is not helping or if you have side effects  Tell him if you are allergic to any medicine  Keep a list of the medicines, vitamins, and herbs you take  Include the amounts, and when and why you take them  Bring the list or the pill bottles to follow-up visits  Carry your medicine list with you in case of an emergency  Management:   · Rest  your tendon as directed to help it heal  Ask your healthcare provider if you need to stop putting weight on your affected area  · Ice  helps decrease swelling and pain  Ice may also help prevent tissue damage  Use an ice pack, or put crushed ice in a plastic bag  Cover it with a towel and place it on the affected area for 10 to 15 minutes every hour or as directed      · Support devices  such as a cane, splint, shoe insert, or brace may help reduce your pain  · Physical therapy  may be ordered by your healthcare provider  This may be used to teach you exercises to help improve movement and strength, and to decrease pain  You may also learn how to improve your posture, and how to lift or exercise correctly  Prevention:   · Stretch and warm up  before you exercise  · Exercise regularly  to strengthen the muscles around your joint  Ease into an exercise routine for the first 3 weeks to prevent another injury  Ask your healthcare provider about the best exercise plan for you  Rest fully between activities  · Use the right equipment  for sports and exercise  Wear braces or tape around weak joints as directed  Follow up with your healthcare provider as directed:  Write down your questions so you remember to ask them during your visits  Contact your healthcare provider if:   · You have increased pain even after you take medicine  · You have questions or concerns about your condition or care  Return to the emergency department if:   · You have increased redness over the joint, or swelling in the joint  · You suddenly cannot move your joint  © Copyright [employer] 2021 Information is for End User's use only and may not be sold, redistributed or otherwise used for commercial purposes  All illustrations and images included in CareNotes® are the copyrighted property of A [employer] M , Inc  or [employer]   The above information is an  only  It is not intended as medical advice for individual conditions or treatments  Talk to your doctor, nurse or pharmacist before following any medical regimen to see if it is safe and effective for you

## 2022-04-06 ENCOUNTER — EVALUATION (OUTPATIENT)
Dept: PHYSICAL THERAPY | Facility: CLINIC | Age: 67
End: 2022-04-06
Payer: MEDICARE

## 2022-04-06 DIAGNOSIS — M54.50 ACUTE RIGHT-SIDED LOW BACK PAIN WITHOUT SCIATICA: Primary | ICD-10-CM

## 2022-04-06 PROCEDURE — 97162 PT EVAL MOD COMPLEX 30 MIN: CPT | Performed by: PHYSICAL THERAPIST

## 2022-04-06 PROCEDURE — 97110 THERAPEUTIC EXERCISES: CPT | Performed by: PHYSICAL THERAPIST

## 2022-04-06 NOTE — PROGRESS NOTES
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  Upon examination, patient was found to have objective deficits as listed below and is displaying ss consistent with paraspinal mm spasm and lumbar hypomobility  Pt  Is experiencing subsequent functional deficits including pain and difficulty walking, standing, and navigating stairs  Pt  Was educated on role of PT to address issues and given initial treatment with HEP  Pt  Would benefit from skilled physical therapy to promote improved function and maximize activity tolerance  Symptom irritability: highUnderstanding of Dx/Px/POC: good  Goals  ST weeks  -Pt  Will demonstrate L/S flexion AROM improvement of 25%  -Pt  Will demonstrate improved core stabilizer contraction, promoting improved muscle balance  LT weeks  -Pt  Will demonstrate ability to walk 1 mile without pain, demonstrating improved functional mobility   -Pt  Will demonstrate L/S AROM WNL  -Pt  Will demonstrate I with HEP upon DC  -Pt  Will demonstrate ability to effectively contract core mm with appropriate strength to perform lifting task     Plan  Patient would benefit from: skilled physical therapy  Planned modality interventions: cryotherapy, high voltage pulsed current: spasm management, high voltage pulsed current: pain management, thermotherapy: hydrocollator packs and unattended electrical stimulation  Planned therapy interventions: abdominal trunk stabilization, cognitive skills, functional ROM exercises, home exercise program, therapeutic training, therapeutic exercise, therapeutic activities, stretching, strengthening, postural training, neuromuscular re-education, motor coordination training, manual therapy, joint mobilization and massage  Frequency: 2x week  Duration in weeks: 6  Plan of Care beginning date:22   Plan of Care expiration date: 22  Treatment plan discussed with: patient         Subjective Evaluation     History of Present Illness  Mechanism of injury: Anna Chen presents to PT with cc of acute exacerbation of low back pain beginning approx 2 weeks ago  He denies any red flag symptoms including bowel bladder changes or saddle paresthesias  Pt  Recently began working as umpire in baseball and notes signiicant increase in R sided low back pain  He describes pain as a stabbing and cramping  Pain  Current pain ratin  At best pain ratin  At worst pain rating: 10  Location:  paraspinals, R PSIS, R gluteals  Quality: tight, sharp, pressure   Relieving factors: rest, ice, heat and medications  Aggravating factors: walking, standing, sitting, stair climbing, lifting and running  Progression: worsening     Social Support  Steps to enter house: yes  Stairs in house: yes      Employment status: full time  Treatments  Previous Treatment: PT  Current Treatment: None        Objective      Concurrent Complaints  Negative for night pain, disturbed sleep, bladder dysfunction, bowel dysfunction and saddle (S4) numbness      Postural Observations  Seated posture: fair  Standing posture: fair           Palpation   Left   Tenderness of the erector spinae  Right   Muscle spasm in the quadratus lumborum  Tenderness of the erector spinae and quadratus lumborum  Tenderness      Lumbar Spine  Tenderness in the spinous process  Left Hip   Tenderness in the PSIS        Neurological Testing      Sensation      Lumbar   Left   Intact: light touch     Right   Intact: light touch     Reflexes   Left   Patellar (L4): brisk (2+)     Right   Patellar (L4): brisk (2+) Active Range of Motion      Lumbar   Flexion:  with pain Restriction level: moderate  Extension:  WFL  Left lateral flexion:  with pain Restriction level: moderate  Right lateral flexion:  with pain Restriction level: minimal  Left rotation:  WFL  Right rotation:  [facility]     Strength/Myotome Testing      Lumbar   Left   Normal strength     Right   Normal strength     Tests      Lumbar      Left   Negative crossed SLR  Positive Passive SLR     Right   Negative crossed SLR   Positive Passive SLR         General Comments:     Lower quarter screen   Hips: unremarkable  Knees: unremarkable  Foot/ankle: unremarkable    Precautions: None    Daily Treatment Diary     Manual  4/6            Pelvic Rocking 5            Sacral Rocking 5            L/S STM 5            Piriformis TpR -                             Exercise Diary  4/6            Warm up  -            LTR 10x            SKTC -            TrA with gluteal iso -            PPT -            Marches with TrA -            Clamshells -            Ball squeeze -            90/90 stretch 10x5"            Piriformis Stretch 4x30"            Squats with TrA -            Marches with TrA -            PB marches -            Bridges -            Cat/Cow 10x            [employer] -                                                                    Modalities  4/6            IFC with MHP 15 min

## 2022-04-13 ENCOUNTER — OFFICE VISIT (OUTPATIENT)
Dept: PHYSICAL THERAPY | Facility: CLINIC | Age: 67
End: 2022-04-13
Payer: MEDICARE

## 2022-04-13 DIAGNOSIS — M54.50 ACUTE RIGHT-SIDED LOW BACK PAIN WITHOUT SCIATICA: Primary | ICD-10-CM

## 2022-04-13 PROCEDURE — 97140 MANUAL THERAPY 1/> REGIONS: CPT | Performed by: PHYSICAL THERAPIST

## 2022-04-13 PROCEDURE — 97110 THERAPEUTIC EXERCISES: CPT | Performed by: PHYSICAL THERAPIST

## 2022-04-13 PROCEDURE — 97112 NEUROMUSCULAR REEDUCATION: CPT | Performed by: PHYSICAL THERAPIST

## 2022-04-13 NOTE — PROGRESS NOTES
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 but up but are short lived  Feels ready to initiate core strengthening  Objective: See treatment diary below      Assessment: Francis King was progressed with PT interventions, focusing on appropriate technique and intensity  Pt  Required frequent cuing from therapist to complete  TrA activation increased today with new core exercises causing significant fatigue  Pt  Would benefit from continued physical therapy to promote improved activity tolerance and function  Plan: Continue per plan of care  Progress treatment as tolerated          Precautions: None    Daily Treatment Diary     Manual             Pelvic Rocking 5 5'           Sacral Rocking 5 5'           L/S STM 5 5'           Piriformis TpR -                             Exercise Diary             Warm up  - L1 10'           LTR 10x 10x10"           SKTC - 10x10"           Prone knee bends c tra - 10x5" ea           PPT -            Marches with TrA - 20x ea c PPT c mod Vc           Clamshells - S/L 30x ea           Ball squeeze -            90/90 stretch 10x5" 10x5"           Piriformis Stretch 4x30" 4x30"           Squats with TrA -            Marches with TrA -            Dead bugs - 4xmax           Bridges - -           Cat/Cow 10x 20x           Jono Screen - 2x1'           L/s hamstring stretch - 4x30"                                                      Modalities             IFC with MHP 15 min Held time

## 2022-12-19 ENCOUNTER — OFFICE VISIT (OUTPATIENT)
Dept: URGENT CARE | Facility: CLINIC | Age: 67
End: 2022-12-19

## 2022-12-19 ENCOUNTER — APPOINTMENT (OUTPATIENT)
Dept: RADIOLOGY | Facility: CLINIC | Age: 67
End: 2022-12-19

## 2022-12-19 VITALS
BODY MASS INDEX: 31.5 KG/M2 | HEART RATE: 70 BPM | RESPIRATION RATE: 18 BRPM | TEMPERATURE: 97 F | DIASTOLIC BLOOD PRESSURE: 74 MMHG | WEIGHT: 225 LBS | OXYGEN SATURATION: 97 % | HEIGHT: 71 IN | SYSTOLIC BLOOD PRESSURE: 146 MMHG

## 2022-12-19 DIAGNOSIS — S90.01XA CONTUSION OF RIGHT ANKLE, INITIAL ENCOUNTER: ICD-10-CM

## 2022-12-19 DIAGNOSIS — L03.119 CELLULITIS OF ANTERIOR LOWER LEG: Primary | ICD-10-CM

## 2022-12-19 RX ORDER — CEPHALEXIN 500 MG/1
500 CAPSULE ORAL EVERY 12 HOURS SCHEDULED
Qty: 14 CAPSULE | Refills: 0 | Status: SHIPPED | OUTPATIENT
Start: 2022-12-19 | End: 2022-12-26

## 2022-12-19 NOTE — PROGRESS NOTES
[address] [facility]        NAME: Allen Cabral is a 79 y o  male  : 1955    MRN: [id number]  DATE: 2022  TIME: 10:32 AM    Assessment and Plan   Cellulitis of anterior lower leg [D49 133]  1  Cellulitis of anterior lower leg  cephalexin (KEFLEX) 500 mg capsule      2  Contusion of right ankle, initial encounter  XR tibia fibula 2 vw right        Discussed problem with patient  Prescribing Keflex for right shin cellulitis  No acute findings on right shin x-ray but awaiting official radiology report and will call with any findings  Advised local wound care for cellulitis and cellulitis info sheet was given  Advised should follow-up with PCP in 7 to 10 days for wound recheck  Should report to the ER symptoms worsen  Patient Instructions       Follow up with PCP in 3-5 days  Proceed to  ER if symptoms worsen  Chief Complaint     Chief Complaint   Patient presents with   • Wound Infection     Dropped machine on right leg on 4 days ago  Open and reddened from knee down to lower leg  Very painful  History of Present Illness       40-year-old male presents with a right shin wound that occurred 1 week ago  Patient states he was helping move a 300 pound bowling ball retriever when it dropped and slid down his right shin causing an anterior abrasion  Patient has been performing local wound care to it since but has a new onset of 7 out of 10 pain and minor erythema around the wound  Patient is able to ambulate with some pain  Denies any fevers or chills  Review of Systems   Review of Systems   Constitutional: Negative for appetite change, chills, fatigue and fever  Respiratory: Negative for cough, shortness of breath, wheezing and stridor  Cardiovascular: Negative for chest pain and palpitations           Current Medications       Current Outpatient Medications:   •  amLODIPine (NORVASC) 10 mg tablet, 25 mg , Disp: , Rfl:   •  aspirin 81 mg chewable tablet, Chew 81 mg daily, Disp: , Rfl:   •  atorvastatin (LIPITOR) 10 mg tablet, , Disp: , Rfl:   •  cephalexin (KEFLEX) 500 mg capsule, Take 1 capsule (500 mg total) by mouth every 12 (twelve) hours for 7 days, Disp: 14 capsule, Rfl: 0  •  hydrochlorothiazide (HYDRODIURIL) 25 mg tablet, , Disp: , Rfl:   •  losartan (COZAAR) 50 mg tablet, , Disp: , Rfl:   •  propranolol (INDERAL LA) 60 mg 24 hr capsule, , Disp: , Rfl:   •  meloxicam (MOBIC) 15 mg tablet, , Disp: , Rfl:   •  methocarbamol (ROBAXIN) 750 mg tablet, Take 1 tablet (750 mg total) by mouth every 6 (six) hours as needed for muscle spasms for up to 5 days, Disp: 20 tablet, Rfl: 0  •  predniSONE 10 mg tablet, Take once daily all days pills on this schedule 6- 6- 5- 4- 3- 2- 1 (Patient not taking: Reported on 12/19/2022), Disp: 27 tablet, Rfl: 0    Current Allergies     Allergies as of 12/19/2022 - Reviewed 12/19/2022   Allergen Reaction Noted   • Adhesive [medical tape] Rash 03/12/2020   • Other Rash 03/16/2015            The following portions of the patient's history were reviewed and updated as appropriate: allergies, current medications, past family history, past medical history, past social history, past surgical history and problem list      Past Medical History:   Diagnosis Date   • Ankle injury    • Arthritis    • High cholesterol    • Hypertension        Past Surgical History:   Procedure Laterality Date   • ADENOIDECTOMY     • CARPAL TUNNEL RELEASE Bilateral    • JOINT REPLACEMENT     • MENISCECTOMY Bilateral    • REPLACEMENT TOTAL KNEE Left    • TONSILLECTOMY         Family History   Problem Relation Age of Onset   • Heart disease Mother    • Arthritis Mother    • Heart disease Father          Medications have been verified  Objective   /74   Pulse 70   Temp (!) 97 °F (36 1 °C)   Resp 18   Ht 5' 11" (1 803 m)   Wt 102 kg (225 lb)   SpO2 97%   BMI 31 38 kg/m²        Physical Exam     Physical Exam  Vitals reviewed     Constitutional:       General: He is not in acute distress  Appearance: Normal appearance  He is normal weight  He is not ill-appearing, toxic-appearing or diaphoretic  Cardiovascular:      Rate and Rhythm: Normal rate and regular rhythm  Pulses: Normal pulses  Heart sounds: Normal heart sounds  No murmur heard  No friction rub  No gallop  Pulmonary:      Effort: Pulmonary effort is normal  No respiratory distress  Breath sounds: Normal breath sounds  No stridor  No wheezing, rhonchi or rales  Chest:      Chest wall: No tenderness  Skin:     General: Skin is warm  Capillary Refill: Capillary refill takes less than 2 seconds  Findings: Abrasion, erythema and signs of injury present  No abscess, bruising, ecchymosis, laceration, lesion, petechiae, rash or wound  Comments: Right anterior abrasion running from the base of the knee down to ankle  Minor erythema around abrasion with minor swelling  Also has some warmth without purulence or discharge  Neurological:      General: No focal deficit present  Mental Status: He is alert and oriented to person, place, and time     Psychiatric:         Mood and Affect: Mood normal          Behavior: Behavior normal

## 2022-12-19 NOTE — PATIENT INSTRUCTIONS
Discussed problem with patient  Prescribing antibiotic for right shin cellulitis  No acute findings on x-rays  Awaiting official radiology results  Advised to take Tylenol or ibuprofen for pain symptoms as well as use ice and heat for contusions  Can also elevate the leg at night for symptoms  Advised to follow-up with PCP in 7 to 10 days for wound recheck

## 2023-01-05 ENCOUNTER — OFFICE VISIT (OUTPATIENT)
Dept: URGENT CARE | Facility: CLINIC | Age: 68
End: 2023-01-05

## 2023-01-05 VITALS
WEIGHT: 230 LBS | HEIGHT: 71 IN | OXYGEN SATURATION: 95 % | DIASTOLIC BLOOD PRESSURE: 76 MMHG | SYSTOLIC BLOOD PRESSURE: 153 MMHG | HEART RATE: 69 BPM | TEMPERATURE: 98.6 F | RESPIRATION RATE: 20 BRPM | BODY MASS INDEX: 32.2 KG/M2

## 2023-01-05 DIAGNOSIS — J20.9 ACUTE BRONCHITIS, UNSPECIFIED ORGANISM: Primary | ICD-10-CM

## 2023-01-05 DIAGNOSIS — R05.1 ACUTE COUGH: ICD-10-CM

## 2023-01-05 LAB
SARS-COV-2 AG UPPER RESP QL IA: NEGATIVE
VALID CONTROL: NORMAL

## 2023-01-05 RX ORDER — BENZONATATE 100 MG/1
100 CAPSULE ORAL 3 TIMES DAILY PRN
Qty: 20 CAPSULE | Refills: 0 | Status: SHIPPED | OUTPATIENT
Start: 2023-01-05

## 2023-01-05 RX ORDER — ALBUTEROL SULFATE 90 UG/1
2 AEROSOL, METERED RESPIRATORY (INHALATION) EVERY 6 HOURS PRN
Qty: 8.5 G | Refills: 0 | Status: SHIPPED | OUTPATIENT
Start: 2023-01-05

## 2023-01-05 NOTE — PROGRESS NOTES
[facility]        NAME: Eduardo Guerra is a 79 y o  male  : 1955    MRN: [id number]  DATE: 2023  TIME: 8:35 AM    Assessment and Plan   Acute bronchitis, unspecified organism [J20 9]  1  Acute bronchitis, unspecified organism        2  Acute cough  Poct Covid 19 Rapid Antigen Test    albuterol (ProAir HFA) 90 mcg/act inhaler    benzonatate (TESSALON PERLES) 100 mg capsule            Patient Instructions     Albuterol and Tessalon as prescribed  Mucinex over the counter  Vitamin D3 2000 IU daily  Vitamin C 1000mg twice per day  Multivitamin daily  Fluids and rest  Follow up with PCP in 3-5 days  Proceed to  ER if symptoms worsen  Chief Complaint     Chief Complaint   Patient presents with   • Nasal Congestion     Pt reports upper and lower chest congestion with a productive cough since 23  History of Present Illness       Cough  This is a new problem  Episode onset: 4d  The cough is productive of sputum  Associated symptoms include rhinorrhea, shortness of breath and wheezing  Pertinent negatives include no chest pain, chills, ear pain, fever, headaches, myalgias, postnasal drip, rash or sore throat  Treatments tried: mucinex  There is no history of asthma, COPD or pneumonia  Review of Systems   Review of Systems   Constitutional: Negative for chills and fever  HENT: Positive for congestion and rhinorrhea  Negative for dental problem, ear discharge, ear pain, facial swelling, postnasal drip, sinus pressure, sinus pain, sneezing, sore throat and trouble swallowing  Eyes: Negative for itching  Respiratory: Positive for cough, chest tightness, shortness of breath and wheezing  Cardiovascular: Negative for chest pain and palpitations  Gastrointestinal: Negative for abdominal pain, constipation, diarrhea, nausea and vomiting  Musculoskeletal: Negative for myalgias  Skin: Negative for rash     Neurological: Negative for dizziness, weakness, light-headedness and headaches           Current Medications       Current Outpatient Medications:   •  albuterol (ProAir HFA) 90 mcg/act inhaler, Inhale 2 puffs every 6 (six) hours as needed for wheezing or shortness of breath, Disp: 8 5 g, Rfl: 0  •  amLODIPine (NORVASC) 10 mg tablet, 25 mg , Disp: , Rfl:   •  aspirin 81 mg chewable tablet, Chew 81 mg daily, Disp: , Rfl:   •  atorvastatin (LIPITOR) 10 mg tablet, , Disp: , Rfl:   •  benzonatate (TESSALON PERLES) 100 mg capsule, Take 1 capsule (100 mg total) by mouth 3 (three) times a day as needed for cough, Disp: 20 capsule, Rfl: 0  •  hydrochlorothiazide (HYDRODIURIL) 25 mg tablet, , Disp: , Rfl:   •  losartan (COZAAR) 50 mg tablet, , Disp: , Rfl:   •  propranolol (INDERAL LA) 60 mg 24 hr capsule, , Disp: , Rfl:   •  meloxicam (MOBIC) 15 mg tablet, , Disp: , Rfl:   •  methocarbamol (ROBAXIN) 750 mg tablet, Take 1 tablet (750 mg total) by mouth every 6 (six) hours as needed for muscle spasms for up to 5 days, Disp: 20 tablet, Rfl: 0  •  predniSONE 10 mg tablet, Take once daily all days pills on this schedule 6- 6- 5- 4- 3- 2- 1 (Patient not taking: Reported on 12/19/2022), Disp: 27 tablet, Rfl: 0    Current Allergies     Allergies as of 01/05/2023 - Reviewed 01/05/2023   Allergen Reaction Noted   • Adhesive [medical tape] Rash 03/12/2020   • Other Rash 03/16/2015            The following portions of the patient's history were reviewed and updated as appropriate: allergies, current medications, past family history, past medical history, past social history, past surgical history and problem list      Past Medical History:   Diagnosis Date   • Ankle injury    • Arthritis    • High cholesterol    • Hypertension        Past Surgical History:   Procedure Laterality Date   • ADENOIDECTOMY     • CARPAL TUNNEL RELEASE Bilateral    • JOINT REPLACEMENT     • MENISCECTOMY Bilateral    • REPLACEMENT TOTAL KNEE Left    • TONSILLECTOMY         Family History   Problem Relation Age of Onset   • Heart disease Mother    • Arthritis Mother    • Heart disease Father          Medications have been verified  Objective   /76   Pulse 69   Temp 98 6 °F (37 °C)   Resp 20   Ht 5' 11" (1 803 m)   Wt 104 kg (230 lb)   SpO2 95%   BMI 32 08 kg/m²   No LMP for male patient  Physical Exam     Physical Exam  Vitals reviewed  Constitutional:       General: He is not in acute distress  Appearance: He is well-developed  He is not diaphoretic  HENT:      Head: Normocephalic  Right Ear: Tympanic membrane and external ear normal       Left Ear: Tympanic membrane and external ear normal       Nose: Nose normal       Mouth/Throat:      Pharynx: No oropharyngeal exudate or posterior oropharyngeal erythema  Eyes:      Conjunctiva/sclera: Conjunctivae normal    Cardiovascular:      Rate and Rhythm: Normal rate and regular rhythm  Heart sounds: Normal heart sounds  No murmur heard  No friction rub  No gallop  Pulmonary:      Effort: Pulmonary effort is normal  No respiratory distress  Breath sounds: Normal breath sounds  No wheezing, rhonchi or rales  Lymphadenopathy:      Cervical: No cervical adenopathy  Skin:     General: Skin is warm  Neurological:      Mental Status: He is alert and oriented to person, place, and time  Psychiatric:         Behavior: Behavior normal          Thought Content:  Thought content normal          Judgment: Judgment normal

## 2023-01-05 NOTE — PATIENT INSTRUCTIONS
Albuterol and Tessalon as prescribed  Mucinex over the counter  Vitamin D3 2000 IU daily  Vitamin C 1000mg twice per day  Multivitamin daily  Fluids and rest  Follow up with PCP in 3-5 days  Proceed to  ER if symptoms worsen

## 2023-01-07 ENCOUNTER — OFFICE VISIT (OUTPATIENT)
Dept: URGENT CARE | Facility: CLINIC | Age: 68
End: 2023-01-07

## 2023-01-07 VITALS
WEIGHT: 230 LBS | BODY MASS INDEX: 32.2 KG/M2 | RESPIRATION RATE: 20 BRPM | DIASTOLIC BLOOD PRESSURE: 79 MMHG | OXYGEN SATURATION: 96 % | HEART RATE: 93 BPM | SYSTOLIC BLOOD PRESSURE: 133 MMHG | TEMPERATURE: 98.1 F | HEIGHT: 71 IN

## 2023-01-07 DIAGNOSIS — J40 BRONCHITIS: Primary | ICD-10-CM

## 2023-01-07 RX ORDER — PREDNISONE 10 MG/1
TABLET ORAL
Qty: 21 TABLET | Refills: 0 | Status: SHIPPED | OUTPATIENT
Start: 2023-01-07

## 2023-01-07 RX ORDER — ALBUTEROL SULFATE 90 UG/1
2 AEROSOL, METERED RESPIRATORY (INHALATION) EVERY 6 HOURS PRN
Qty: 8.5 G | Refills: 0 | Status: SHIPPED | OUTPATIENT
Start: 2023-01-07

## 2023-01-07 RX ORDER — AZITHROMYCIN 250 MG/1
TABLET, FILM COATED ORAL
Qty: 6 TABLET | Refills: 0 | Status: SHIPPED | OUTPATIENT
Start: 2023-01-07 | End: 2023-01-11

## 2023-01-07 NOTE — PROGRESS NOTES
[facility] [address] [facility]        NAME: Lisa Garza is a 79 y o  male  : 1955    MRN: [id number]  DATE: 2023  TIME: 8:24 AM    Assessment and Plan   Bronchitis [J40]  1  Bronchitis  albuterol (ProAir HFA) 90 mcg/act inhaler    azithromycin (ZITHROMAX) 250 mg tablet    predniSONE 10 mg tablet            Patient Instructions     Take the steroids and antibiotics with food  Continue to use the inhaler as needed of wheezing   Follow up with PCP in 3-5 days  Proceed to  ER if symptoms worsen  Chief Complaint     Chief Complaint   Patient presents with   • Cold Like Symptoms     Was seen here on the - states he is much worse- has weakness and trouble with breathing; has been using inhaler every half hour to hour         History of Present Illness       Patient is a 67YOM presenting with worsening cough, fatigue, wheezing and intermittent shortness of breath  He was seen here on 2022 and given and inhaler and tessalon perls  He has not noticed any improvement in his symptoms  He has been using the inhaler daily without relief  He denies any fever, chills, chest pain, vomiting or diarrhea  Review of Systems   Review of Systems   Constitutional: Positive for activity change, appetite change and fatigue  Negative for chills and fever  HENT: Positive for congestion  Negative for ear pain and sore throat  Respiratory: Positive for cough, chest tightness, shortness of breath and wheezing  Cardiovascular: Negative for chest pain and palpitations  Gastrointestinal: Negative for diarrhea and vomiting  Musculoskeletal: Negative for arthralgias  Neurological: Negative for dizziness and headaches  All other systems reviewed and are negative          Current Medications       Current Outpatient Medications:   •  albuterol (ProAir HFA) 90 mcg/act inhaler, Inhale 2 puffs every 6 (six) hours as needed for wheezing or shortness of breath, Disp: 8 5 g, Rfl: 0  •  albuterol (ProAir HFA) 90 mcg/act inhaler, Inhale 2 puffs every 6 (six) hours as needed for wheezing, Disp: 8 5 g, Rfl: 0  •  amLODIPine (NORVASC) 10 mg tablet, 25 mg , Disp: , Rfl:   •  aspirin 81 mg chewable tablet, Chew 81 mg daily, Disp: , Rfl:   •  atorvastatin (LIPITOR) 10 mg tablet, , Disp: , Rfl:   •  azithromycin (ZITHROMAX) 250 mg tablet, Take 2 tablets today then 1 tablet daily x 4 days, Disp: 6 tablet, Rfl: 0  •  benzonatate (TESSALON PERLES) 100 mg capsule, Take 1 capsule (100 mg total) by mouth 3 (three) times a day as needed for cough, Disp: 20 capsule, Rfl: 0  •  hydrochlorothiazide (HYDRODIURIL) 25 mg tablet, , Disp: , Rfl:   •  losartan (COZAAR) 50 mg tablet, , Disp: , Rfl:   •  predniSONE 10 mg tablet, Take 6 pills day 1, then 5 pills day 2, 4 pills day 3, 3 pills day 4, 2 pills day 5, 1 pill day 6,, Disp: 21 tablet, Rfl: 0  •  propranolol (INDERAL LA) 60 mg 24 hr capsule, , Disp: , Rfl:   •  meloxicam (MOBIC) 15 mg tablet, , Disp: , Rfl:   •  methocarbamol (ROBAXIN) 750 mg tablet, Take 1 tablet (750 mg total) by mouth every 6 (six) hours as needed for muscle spasms for up to 5 days, Disp: 20 tablet, Rfl: 0  •  predniSONE 10 mg tablet, Take once daily all days pills on this schedule 6- 6- 5- 4- 3- 2- 1 (Patient not taking: Reported on 12/19/2022), Disp: 27 tablet, Rfl: 0    Current Allergies     Allergies as of 01/07/2023 - Reviewed 01/07/2023   Allergen Reaction Noted   • Adhesive [medical tape] Rash 03/12/2020   • Other Rash 03/16/2015            The following portions of the patient's history were reviewed and updated as appropriate: allergies, current medications, past family history, past medical history, past social history, past surgical history and problem list      Past Medical History:   Diagnosis Date   • Ankle injury    • Arthritis    • High cholesterol    • Hypertension        Past Surgical History:   Procedure Laterality Date   • ADENOIDECTOMY     • CARPAL TUNNEL RELEASE Bilateral    • JOINT REPLACEMENT     • MENISCECTOMY Bilateral    • REPLACEMENT TOTAL KNEE Left    • TONSILLECTOMY         Family History   Problem Relation Age of Onset   • Heart disease Mother    • Arthritis Mother    • Heart disease Father          Medications have been verified  Objective   /79   Pulse 93   Temp 98 1 °F (36 7 °C)   Resp 20   Ht 5' 11" (1 803 m)   Wt 104 kg (230 lb)   SpO2 96%   BMI 32 08 kg/m²        Physical Exam     Physical Exam  Vitals and nursing note reviewed  Constitutional:       General: He is not in acute distress  Appearance: Normal appearance  He is normal weight  He is not ill-appearing or toxic-appearing  HENT:      Right Ear: Tympanic membrane normal       Left Ear: Tympanic membrane normal       Nose: Congestion present  Mouth/Throat:      Mouth: Mucous membranes are moist       Pharynx: Oropharynx is clear  Cardiovascular:      Rate and Rhythm: Normal rate and regular rhythm  Pulses: Normal pulses  Heart sounds: Normal heart sounds  Pulmonary:      Effort: Pulmonary effort is normal       Breath sounds: Wheezing and rhonchi present  Skin:     General: Skin is warm and dry  Capillary Refill: Capillary refill takes less than 2 seconds  Neurological:      General: No focal deficit present  Mental Status: He is alert and oriented to person, place, and time

## 2023-01-30 ENCOUNTER — OFFICE VISIT (OUTPATIENT)
Dept: URGENT CARE | Facility: CLINIC | Age: 68
End: 2023-01-30

## 2023-01-30 ENCOUNTER — APPOINTMENT (OUTPATIENT)
Dept: RADIOLOGY | Facility: CLINIC | Age: 68
End: 2023-01-30

## 2023-01-30 VITALS
DIASTOLIC BLOOD PRESSURE: 74 MMHG | BODY MASS INDEX: 32.2 KG/M2 | TEMPERATURE: 98.4 F | HEIGHT: 71 IN | SYSTOLIC BLOOD PRESSURE: 152 MMHG | HEART RATE: 50 BPM | WEIGHT: 230 LBS | OXYGEN SATURATION: 94 % | RESPIRATION RATE: 18 BRPM

## 2023-01-30 DIAGNOSIS — J40 BRONCHITIS: Primary | ICD-10-CM

## 2023-01-30 DIAGNOSIS — J40 BRONCHITIS: ICD-10-CM

## 2023-01-30 RX ORDER — ALBUTEROL SULFATE 90 UG/1
2 AEROSOL, METERED RESPIRATORY (INHALATION) EVERY 4 HOURS PRN
Qty: 18 G | Refills: 0 | Status: SHIPPED | OUTPATIENT
Start: 2023-01-30

## 2023-01-30 RX ORDER — PREDNISONE 10 MG/1
10 TABLET ORAL DAILY
Qty: 21 TABLET | Refills: 0 | Status: SHIPPED | OUTPATIENT
Start: 2023-01-30

## 2023-01-30 NOTE — PROGRESS NOTES
[address] [facility]        NAME: Shawn Espinoza is a 79 y o  male  : 1955    MRN: [id number]  DATE: 2023  TIME: 9:53 AM    Assessment and Plan   Bronchitis [J40]  1  Bronchitis  XR chest pa & lateral    albuterol (Proventil HFA) 90 mcg/act inhaler    predniSONE 10 mg tablet        No notable wheezes on exam   Patient using albuterol inhaler incorrectly  Reinforced proper use  Reinforced the importance of follow-up with PCP due to recurrent visits to the urgent care to rule out underlying etiology if symptoms or not improving or resolving  Patient Instructions   Medications as prescribed  Flonase  Zyrtec  Drink plenty of fluids  Follow up with PCP in 3-5 days  Proceed to  ER if symptoms worsen  Chief Complaint     Chief Complaint   Patient presents with   • Cough     Productive with brown mucus starting 4 days         History of Present Illness       Is a 26-year-old male with a past medical history of hypertension and hyperlipidemia presents the office complaining of productive cough for 1 month with worsening symptoms the last 4 days  Reports associated shortness of breath and wheezing  he was seen in the office twice and diagnosed with bronchitis  He was given Tessalon Perles, prednisone  Azithromycin, and albuterol which did improve his symptoms temporarily but they returned last few days  He denies fever, chills, sore throat, congestion, CP, nausea, vomiting, abdominal pain, lower extremity swelling, or rashes  Review of Systems   Review of Systems   Constitutional: Negative for fever  HENT: Positive for postnasal drip  Negative for congestion and sore throat  Respiratory: Positive for cough, chest tightness, shortness of breath and wheezing  Cardiovascular: Negative for chest pain and leg swelling  Gastrointestinal: Negative for abdominal pain, diarrhea, nausea and vomiting  Skin: Negative for rash     Neurological: Negative for dizziness, light-headedness and headaches           Current Medications       Current Outpatient Medications:   •  albuterol (Proventil HFA) 90 mcg/act inhaler, Inhale 2 puffs every 4 (four) hours as needed for wheezing or shortness of breath, Disp: 18 g, Rfl: 0  •  amLODIPine (NORVASC) 10 mg tablet, 25 mg , Disp: , Rfl:   •  aspirin 81 mg chewable tablet, Chew 81 mg daily, Disp: , Rfl:   •  atorvastatin (LIPITOR) 10 mg tablet, , Disp: , Rfl:   •  hydrochlorothiazide (HYDRODIURIL) 25 mg tablet, , Disp: , Rfl:   •  losartan (COZAAR) 50 mg tablet, , Disp: , Rfl:   •  predniSONE 10 mg tablet, Take 1 tablet (10 mg total) by mouth daily Take 6 on day 1, take 5 on day 2, take 4 on day 3, take 3 on day 4, take 2 on day 5, take 1 on day 6 , Disp: 21 tablet, Rfl: 0  •  propranolol (INDERAL LA) 60 mg 24 hr capsule, , Disp: , Rfl:   •  benzonatate (TESSALON PERLES) 100 mg capsule, Take 1 capsule (100 mg total) by mouth 3 (three) times a day as needed for cough (Patient not taking: Reported on 1/30/2023), Disp: 20 capsule, Rfl: 0  •  meloxicam (MOBIC) 15 mg tablet, , Disp: , Rfl:   •  methocarbamol (ROBAXIN) 750 mg tablet, Take 1 tablet (750 mg total) by mouth every 6 (six) hours as needed for muscle spasms for up to 5 days, Disp: 20 tablet, Rfl: 0    Current Allergies     Allergies as of 01/30/2023 - Reviewed 01/30/2023   Allergen Reaction Noted   • Adhesive [medical tape] Rash 03/12/2020   • Other Rash 03/16/2015            The following portions of the patient's history were reviewed and updated as appropriate: allergies, current medications, past family history, past medical history, past social history, past surgical history and problem list      Past Medical History:   Diagnosis Date   • Ankle injury    • Arthritis    • High cholesterol    • Hypertension        Past Surgical History:   Procedure Laterality Date   • ADENOIDECTOMY     • CARPAL TUNNEL RELEASE Bilateral    • JOINT REPLACEMENT     • MENISCECTOMY Bilateral    • REPLACEMENT TOTAL KNEE Left    • TONSILLECTOMY         Family History   Problem Relation Age of Onset   • Heart disease Mother    • Arthritis Mother    • Heart disease Father          Medications have been verified  Objective   /74   Pulse (!) 50   Temp 98 4 °F (36 9 °C)   Resp 18   Ht 5' 11" (1 803 m)   Wt 104 kg (230 lb)   SpO2 94%   BMI 32 08 kg/m²   No LMP for male patient  Physical Exam     Physical Exam  Vitals and nursing note reviewed  Constitutional:       Appearance: Normal appearance  He is well-developed  HENT:      Head: Normocephalic and atraumatic  Right Ear: Tympanic membrane, ear canal and external ear normal       Left Ear: Tympanic membrane, ear canal and external ear normal       Nose: Nose normal       Mouth/Throat:      Pharynx: Uvula midline  Eyes:      General: Lids are normal       Conjunctiva/sclera: Conjunctivae normal       Pupils: Pupils are equal, round, and reactive to light  Cardiovascular:      Rate and Rhythm: Normal rate and regular rhythm  Heart sounds: Normal heart sounds  No murmur heard  No friction rub  No gallop  Pulmonary:      Effort: Pulmonary effort is normal       Breath sounds: Normal breath sounds  No stridor  No wheezing or rales  Musculoskeletal:         General: Normal range of motion  Cervical back: Neck supple  Skin:     General: Skin is warm and dry  Capillary Refill: Capillary refill takes less than 2 seconds  Neurological:      Mental Status: He is alert  Chest x-ray: No evidence of acute cardiopulmonary etiology  Radiology interpretation pending

## 2023-10-05 ENCOUNTER — EVALUATION (OUTPATIENT)
Dept: PHYSICAL THERAPY | Facility: CLINIC | Age: 68
End: 2023-10-05
Payer: MEDICARE

## 2023-10-05 DIAGNOSIS — M54.51 VERTEBROGENIC LOW BACK PAIN: Primary | ICD-10-CM

## 2023-10-05 PROCEDURE — 97140 MANUAL THERAPY 1/> REGIONS: CPT | Performed by: PHYSICAL THERAPIST

## 2023-10-05 PROCEDURE — 97110 THERAPEUTIC EXERCISES: CPT | Performed by: PHYSICAL THERAPIST

## 2023-10-05 PROCEDURE — 97161 PT EVAL LOW COMPLEX 20 MIN: CPT | Performed by: PHYSICAL THERAPIST

## 2023-10-05 NOTE — PROGRESS NOTES
PT Evaluation     Today's date: 10/5/2023  Patient name: Vandana Yadav  : 1955  MRN: [id number]  Referring provider: Andrae Odom DO  Dx:   Encounter Diagnosis     ICD-10-CM    1. Vertebrogenic low back pain  M54.51                      Assessment  Assessment details: Pt presents to PT with dx of chronic low back pain. Functionally has pain with sit-stand movement and initial gait, long walks. Pain goes into L thigh but not distal to knee. No signs of active discal involvement. Signs and symptoms appear more joint/piriformis driven with potential stability component. Skilled PT warranted to address findings and progress towards outlined goals. Pt in agreement with current POC.       Symptom irritability: moderateUnderstanding of Dx/Px/POC: good   Prognosis: good    Goals  ST-3 weeks  Independent with phase I lumbar stability program  Symmetrical hip mobility L IR compared to R  Independent with hip strengthening program for HEP  Reduce pain by 50% with ADLs and sit-stand transfer      LT-8 weeks  Independent with trunk stability program both open and closed chain for functional strength  Review proper lifting and lumbar mechanics for painfree lifting with ADLs and household activity  Reduce pain by 75% with all activity and ambulation  Complete SLS for 10 sec b/l with proper hip and trunk control      Plan  Plan details: TE, NMR, TA, MT, self education, and modalities as needed in order to progress through skilled PT focused on  ROM, strength, balance, coordination     Patient would benefit from: skilled physical therapy  Planned modality interventions: cryotherapy and thermotherapy: hydrocollator packs  Planned therapy interventions: manual therapy, neuromuscular re-education, self care, therapeutic activities, therapeutic exercise and home exercise program  Frequency: 2x week  Duration in weeks: 8  Plan of Care beginning date: 10/5/2023  Plan of Care expiration date: 2023  Treatment plan discussed with: patient        Subjective Evaluation    History of Present Illness  Mechanism of injury: 79year old male presenting to PT with chronic low back pain. Historically he had pain into the right side however now it is the left side. When it goes down his left side it goes to his knee and at times the leg feels "dead". Difficulty leading up the stairs with the leg due to pain/weakness, has to use hand-rail. Sitting is not bad but going from sitting to standing is difficult and he has to wait a minute prior to moving. Walking long distances bothers him. Denies any back or hip surgeries. Denies any saddle anestheia or bowel/bladder problems. Works part time for local [employer].    Pain  At best pain rating: 3  At worst pain ratin          Objective  Observation/Gait  Antalgic gait with initial gait but did improve after a few steps      Multi-seg movement patterns  Flex to distal tibia - no pain with repetition  Ext: scap just before heels - no pain with repetition  SB: distal femur - no pain  ROT: no pain    No pain with facet loading    Lumbar  Hypomobile PA    No pain with prone press-up, limited ext mobility    No tenderness through lumbar paraspinals    Tender through piriformis      Hip screen  R hip IR (10 deg), L 20 deg    Passive SLR: 50 deg  No pain with SLR or sensitized      Strength/myotome  No distal weakness @ ankle    Light touch sensation: symmetrical      DTR's  1+ L4 and S1                   Precautions: chronic LBP    Daily Treatment Diary:      Initial Evaluation Date: 10/05/23  POC Expiration: 23  Compliance 10/05/23                     Visit Number 1                    Re-Eval  IE                 [facility]   Foto Captured Y                      Manuals 10/       Manual traction        Joint work Hip lat distraction and Mulligan IR mob       flexibility        ST Piriformis TpR       Neuro Re-Ed        [employer]        birddog        bridge Ther Ex        NuStep/TM        clamshells        Hip ext        Leg press        Ham curls        Knee ext        Ankle strength - band        Standing HR        bridge x15       Lumbar flex        Supine hand to knee x10 ea 5"       SKTC HEP       LTR HEP       Supine 90/90 x10               Piriformis stretch HEP       education Provided written HEP and reviewed with pt       Ther Activity                        Gait Training                        Modalities                          Access Code: [email or web address]  URL: [email or web address]  Date: 10/05/2023  Prepared by: Luis Eduardo Aviles    Exercises  - Supine 90/90 Sciatic Nerve Glide with Knee Flexion/Extension  - 1 x daily - 7 x weekly - 3 sets - 10 reps  - Supine Bridge  - 1 x daily - 7 x weekly - 3 sets - 10 reps  - Supine Alternating Knee Taps with Hands  - 1 x daily - 7 x weekly - 3 sets - 10 reps  - Supine Lower Trunk Rotation  - 1 x daily - 7 x weekly - 3 sets - 10 reps  - Hooklying Single Knee to Chest Stretch  - 1 x daily - 7 x weekly - 3 sets - 10 reps

## 2023-10-05 NOTE — LETTER
2023    Elpidio Bello DO  [address]    Patient: Gómez Steen   YOB: 1955   Date of Visit: 10/5/2023     Encounter Diagnosis     ICD-10-CM    1. Vertebrogenic low back pain  M54.51           Dear Dr. Abdirizak Boyer: Thank you for your recent referral of Gómez Steen. Please review the attached evaluation summary from Kt's recent visit. Please verify that you agree with the plan of care by signing the attached order. If you have any questions or concerns, please do not hesitate to call. I sincerely appreciate the opportunity to share in the care of one of your patients and hope to have another opportunity to work with you in the near future. Sincerely,    Luis Carlos Smith, PT      Referring Provider:      I certify that I have read the below Plan of Care and certify the need for these services furnished under this plan of treatment while under my care. Elpidio Bello DO  [address] [place] [address]  Via Fax: [phone]          PT Evaluation     Today's date: 10/5/2023  Patient name: Gómez Steen  : 1955  MRN: [id number]  Referring provider: Elpidio Bello DO  Dx:   Encounter Diagnosis     ICD-10-CM    1. Vertebrogenic low back pain  M54.51                      Assessment  Assessment details: Pt presents to PT with dx of chronic low back pain. Functionally has pain with sit-stand movement and initial gait, long walks. Pain goes into L thigh but not distal to knee. No signs of active discal involvement. Signs and symptoms appear more joint/piriformis driven with potential stability component. Skilled PT warranted to address findings and progress towards outlined goals. Pt in agreement with current POC.       Symptom irritability: moderateUnderstanding of Dx/Px/POC: good   Prognosis: good    Goals  ST-3 weeks  Independent with phase I lumbar stability program  Symmetrical hip mobility L IR compared to R  Independent with hip strengthening program for HEP  Reduce pain by 50% with ADLs and sit-stand transfer      LT-8 weeks  Independent with trunk stability program both open and closed chain for functional strength  Review proper lifting and lumbar mechanics for painfree lifting with ADLs and household activity  Reduce pain by 75% with all activity and ambulation  Complete SLS for 10 sec b/l with proper hip and trunk control      Plan  Plan details: TE, NMR, TA, MT, self education, and modalities as needed in order to progress through skilled PT focused on  ROM, strength, balance, coordination     Patient would benefit from: skilled physical therapy  Planned modality interventions: cryotherapy and thermotherapy: hydrocollator packs  Planned therapy interventions: manual therapy, neuromuscular re-education, self care, therapeutic activities, therapeutic exercise and home exercise program  Frequency: 2x week  Duration in weeks: 8  Plan of Care beginning date: 10/5/2023  Plan of Care expiration date: 2023  Treatment plan discussed with: patient        Subjective Evaluation    History of Present Illness  Mechanism of injury: 79year old male presenting to PT with chronic low back pain. Historically he had pain into the right side however now it is the left side. When it goes down his left side it goes to his knee and at times the leg feels "dead". Difficulty leading up the stairs with the leg due to pain/weakness, has to use hand-rail. Sitting is not bad but going from sitting to standing is difficult and he has to wait a minute prior to moving. Walking long distances bothers him. Denies any back or hip surgeries. Denies any saddle anestheia or bowel/bladder problems. Works part time for local [employer].    Pain  At best pain rating: 3  At worst pain ratin          Objective  Observation/Gait  Antalgic gait with initial gait but did improve after a few steps      Multi-seg movement patterns  Flex to distal tibia - no pain with repetition  Ext: scap just before heels - no pain with repetition  SB: distal femur - no pain  ROT: no pain    No pain with facet loading    Lumbar  Hypomobile PA    No pain with prone press-up, limited ext mobility    No tenderness through lumbar paraspinals    Tender through piriformis      Hip screen  R hip IR (10 deg), L 20 deg    Passive SLR: 50 deg  No pain with SLR or sensitized      Strength/myotome  No distal weakness @ ankle    Light touch sensation: symmetrical      DTR's  1+ L4 and S1                  Precautions: chronic LBP    Daily Treatment Diary:      Initial Evaluation Date: 10/05/23  POC Expiration: 11/30/23  Compliance 10/05/23                     Visit Number 1                    Re-Eval  IE                 [facility]   Foto Captured Y                      Manuals 10/5       Manual traction        Joint work Hip lat distraction and Mulligan IR mob       flexibility        ST Piriformis TpR       Neuro Re-Ed        TA        TA+march        birddog        bridge                                Ther Ex        NuStep/TM        clamshells        Hip ext        Leg press        Ham curls        Knee ext        Ankle strength - band        Standing HR        bridge x15       Lumbar flex        Supine hand to knee x10 ea 5"       SKTC HEP       LTR HEP       Supine 90/90 x10               Piriformis stretch HEP       education Provided written HEP and reviewed with pt       Ther Activity                        Gait Training                        Modalities                          Access Code: [email or web address]  URL: [email or web address]  Date: 10/05/2023  Prepared by: Jania Model    Exercises  - Supine 90/90 Sciatic Nerve Glide with Knee Flexion/Extension  - 1 x daily - 7 x weekly - 3 sets - 10 reps  - Supine Bridge  - 1 x daily - 7 x weekly - 3 sets - 10 reps  - Supine Alternating Knee Taps with Hands  - 1 x daily - 7 x weekly - 3 sets - 10 reps  - Supine Lower Trunk Rotation  - 1 x daily - 7 x weekly - 3 sets - 10 reps  - Hooklying Single Knee to Chest Stretch  - 1 x daily - 7 x weekly - 3 sets - 10 reps

## 2023-10-09 ENCOUNTER — OFFICE VISIT (OUTPATIENT)
Dept: PHYSICAL THERAPY | Facility: CLINIC | Age: 68
End: 2023-10-09
Payer: MEDICARE

## 2023-10-09 DIAGNOSIS — M54.51 VERTEBROGENIC LOW BACK PAIN: Primary | ICD-10-CM

## 2023-10-09 PROCEDURE — 97110 THERAPEUTIC EXERCISES: CPT

## 2023-10-09 PROCEDURE — 97140 MANUAL THERAPY 1/> REGIONS: CPT

## 2023-10-09 NOTE — PROGRESS NOTES
Daily Note     Today's date: 10/9/2023  Patient name: Jessenia Kitchen  : 1955  MRN: [id number]  Referring provider: Rich De La Torre DO  Dx:   Encounter Diagnosis     ICD-10-CM    1. Vertebrogenic low back pain  M54.51                      Subjective: Patient reports has noticed a little decreased discomfort after IE and HEP. Objective: See treatment diary below      Assessment: Jessenia Kitchen tolerated treatment well. He appears to be having decreased discomfort with exercises. Improving core stability should benefit. Continue. Plan: Continue per plan of care. Precautions: chronic LBP    Daily Treatment Diary:      Initial Evaluation Date: 10/05/23  POC Expiration: 23  Compliance 10/05/23  10/9                   Visit Number 1 2                   Re-Eval  IE                 [facility]   Foto Captured Y                      Manuals 10/5 10/9      Manual traction        Joint work Hip lat distraction and Mulligan IR mob Hip lat distraction and Mulligan IR mob MM      flexibility        ST Piriformis TpR Piriformis TpR      Neuro Re-Ed        TA  5"x20      TA+march  GTB x20 ea      birddog                                        Ther Ex        NuStep/TM  L1 10'      cla  GTB 5"x20      Hip ext  nv      Leg press        Ham curls        Knee ext        Ankle strength - band        Standing HR  nv      bridge x15 x20      Lumbar flex        Supine hand to knee x10 ea 5" x10 ea 5"      SKTC HEP 10"x10      LTR HEP x20      Supine 90/90 x10 x10 ea      Swiss ball iso  5"x20      Piriformis stretch HEP 30"x4 ea      education Provided written HEP and reviewed with pt +      Ther Activity                        Gait Training                        Modalities                          Access Code: [email or web address]  URL: [email or web address]  Date: 10/05/2023  Prepared by: Marquis Larson    Exercises  - Supine 90/90 Sciatic Nerve Glide with Knee Flexion/Extension  - 1 x daily - 7 x weekly - 3 sets - 10 reps  - Supine Bridge  - 1 x daily - 7 x weekly - 3 sets - 10 reps  - Supine Alternating Knee Taps with Hands  - 1 x daily - 7 x weekly - 3 sets - 10 reps  - Supine Lower Trunk Rotation  - 1 x daily - 7 x weekly - 3 sets - 10 reps  - Hooklying Single Knee to Chest Stretch  - 1 x daily - 7 x weekly - 3 sets - 10 reps

## 2023-10-11 ENCOUNTER — OFFICE VISIT (OUTPATIENT)
Dept: PHYSICAL THERAPY | Facility: CLINIC | Age: 68
End: 2023-10-11
Payer: MEDICARE

## 2023-10-11 DIAGNOSIS — M54.51 VERTEBROGENIC LOW BACK PAIN: Primary | ICD-10-CM

## 2023-10-11 PROCEDURE — 97140 MANUAL THERAPY 1/> REGIONS: CPT

## 2023-10-11 PROCEDURE — 97110 THERAPEUTIC EXERCISES: CPT

## 2023-10-11 NOTE — PROGRESS NOTES
Daily Note     Today's date: 10/11/2023  Patient name: Jessenia Kitchen  : 1955  MRN: [id number]  Referring provider: Rich De La Torre DO  Dx:   Encounter Diagnosis     ICD-10-CM    1. Vertebrogenic low back pain  M54.51                      Subjective: Patient reports some increased soreness today. Reports went bowling last night and hunting this morning. Objective: See treatment diary below      Assessment: Jessenia Kitchen tolerated treatment well. Appeared to have decreased discomfort post treatment. Continued focus on core recruitment and stability should benefit. Plan: Continue per plan of care.       Precautions: chronic LBP    Daily Treatment Diary:      Initial Evaluation Date: 10/05/23  POC Expiration: 23  Compliance 10/05/23  10/9  10/11                 Visit Number 1 2  3                 Re-Eval  IE                 [facility]   Foto Captured Y                      Manuals 10/5 10/9 10/11     Manual traction        Joint work Hip lat distraction and Mulligan IR mob Hip lat distraction and Mulligan IR mob MM Hip lat distraction and Mulligan IR mob MM     flexibility        ST Piriformis TpR Piriformis TpR Piriformis TpR     Neuro Re-Ed        TA  5"x20 5"x20     TA+march  GTB x20 ea GTB x20 ea     birddog                                        Ther Ex        NuStep/TM  L1 10' L1 10'     clamshells  GTB 5"x20 GTB 5"x20     Hip ext  nv nv     Leg press        Ham curls        Knee ext        Ankle strength - band        Standing HR  nv x30     bridge x15 x20 x20     Lumbar flex        Supine hand to knee x10 ea 5" x10 ea 5" x15 ea 5"     SKTC HEP 10"x10 10"x10     LTR HEP x20 x20     Supine 90/90 x10 x10 ea x10 ea     Swiss ball iso  5"x20 5"x20     Piriformis stretch HEP 30"x4 ea 30"x4 ea     education Provided written HEP and reviewed with pt       Ther Activity                        Gait Training                        Modalities                          Access Code: [email or web address]  URL: [email or web address]  Date: 10/05/2023  Prepared by: Tate Bang    Exercises  - Supine 90/90 Sciatic Nerve Glide with Knee Flexion/Extension  - 1 x daily - 7 x weekly - 3 sets - 10 reps  - Supine Bridge  - 1 x daily - 7 x weekly - 3 sets - 10 reps  - Supine Alternating Knee Taps with Hands  - 1 x daily - 7 x weekly - 3 sets - 10 reps  - Supine Lower Trunk Rotation  - 1 x daily - 7 x weekly - 3 sets - 10 reps  - Hooklying Single Knee to Chest Stretch  - 1 x daily - 7 x weekly - 3 sets - 10 reps

## 2023-10-17 ENCOUNTER — OFFICE VISIT (OUTPATIENT)
Dept: PHYSICAL THERAPY | Facility: CLINIC | Age: 68
End: 2023-10-17
Payer: MEDICARE

## 2023-10-17 DIAGNOSIS — M54.51 VERTEBROGENIC LOW BACK PAIN: Primary | ICD-10-CM

## 2023-10-17 PROCEDURE — 97110 THERAPEUTIC EXERCISES: CPT

## 2023-10-17 PROCEDURE — 97140 MANUAL THERAPY 1/> REGIONS: CPT

## 2023-10-17 NOTE — PROGRESS NOTES
Daily Note     Today's date: 10/17/2023  Patient name: Anaya Schulte  : 1955  MRN: [id number]  Referring provider: Delia Tovar DO  Dx:   Encounter Diagnosis     ICD-10-CM    1. Vertebrogenic low back pain  M54.51                      Subjective: Patient reports hip pain has decreased. Reports soreness mostly all in th middle of his back. Objective: See treatment diary below      Assessment: Anaya Schulte appears to be having centralization of symptoms. Core recruitment appears to be improving and will benefit core stability. Continued treatment with focus on core strengthening should resolve symptoms. Plan: Continue per plan of care.       Precautions: chronic LBP    Daily Treatment Diary:      Initial Evaluation Date: 10/05/23  POC Expiration: 23  Compliance 10/05/23  10/9  10/11  10/17               Visit Number 1 2  3  4               Re-Eval  IE                 MC   Foto Captured Y                      Manuals 10/5 10/9 10/11 10/17    Manual traction        Joint work Hip lat distraction and Mulligan IR mob Hip lat distraction and Mulligan IR mob MM Hip lat distraction and Mulligan IR mob MM     Pelvic rocking     sj    ST Piriformis TpR Piriformis TpR Piriformis TpR Piriformis TpR    Neuro Re-Ed        TA  5"x20 5"x20 5"x20    TA+march  GTB x20 ea GTB x20 ea GTB x20 ea    birddog                                        Ther Ex        NuStep/TM  L1 10' L1 10' L1 10'    clamshells  GTB 5"x20 GTB 5"x20 GTB 5"x20    Hip ext  nv nv 2x10 ea    Leg press        Ham curls        Knee ext        Ankle strength - band        Standing HR  nv x30 x30    bridge x15 x20 x20 x20    Lumbar flex        Supine hand to knee x10 ea 5" x10 ea 5" x15 ea 5" x15 ea 5"    SKTC HEP 10"x10 10"x10 10"x10    LTR HEP x20 x20 x20    Supine 90/90 x10 x10 ea x10 ea x10 ea    Swiss ball iso  5"x20 5"x20 5"x20    Piriformis stretch HEP 30"x4 ea 30"x4 ea 30"x4 ea    education Provided written HEP and reviewed with pt       Ther Activity                        Gait Training                        Modalities                          Access Code: [email or web address]  URL: [email or web address]  Date: 10/05/2023  Prepared by: Gloria Bonds    Exercises  - Supine 90/90 Sciatic Nerve Glide with Knee Flexion/Extension  - 1 x daily - 7 x weekly - 3 sets - 10 reps  - Supine Bridge  - 1 x daily - 7 x weekly - 3 sets - 10 reps  - Supine Alternating Knee Taps with Hands  - 1 x daily - 7 x weekly - 3 sets - 10 reps  - Supine Lower Trunk Rotation  - 1 x daily - 7 x weekly - 3 sets - 10 reps  - Hooklying Single Knee to Chest Stretch  - 1 x daily - 7 x weekly - 3 sets - 10 reps

## 2023-10-19 ENCOUNTER — OFFICE VISIT (OUTPATIENT)
Dept: PHYSICAL THERAPY | Facility: CLINIC | Age: 68
End: 2023-10-19
Payer: MEDICARE

## 2023-10-19 DIAGNOSIS — M54.51 VERTEBROGENIC LOW BACK PAIN: Primary | ICD-10-CM

## 2023-10-19 PROCEDURE — 97140 MANUAL THERAPY 1/> REGIONS: CPT

## 2023-10-19 PROCEDURE — 97110 THERAPEUTIC EXERCISES: CPT

## 2023-10-19 NOTE — PROGRESS NOTES
Daily Note     Today's date: 10/19/2023  Patient name: Mart Calderón  : 1955  MRN: [id number]  Referring provider: Craig Gonzalez DO  Dx:   Encounter Diagnosis     ICD-10-CM    1. Vertebrogenic low back pain  M54.51                      Subjective: Patient reports no hip discomfort at all. Reports discomfort just on right side. Objective: See treatment diary below      Assessment: Mart Calderón tolerated treatment well. Core stability improving. Tenderness in LB appears focused over PSIS. Continued treatment should benefit. Plan: Continue per plan of care.       Precautions: chronic LBP    Daily Treatment Diary:      Initial Evaluation Date: 10/05/23  POC Expiration: 23  Compliance 10/05/23  10/9  10/11  10/17  10/19             Visit Number 1 2  3  4  5             Re-Eval  IE                 MC   Foto Captured Y                      Manuals 10/5 10/9 10/11 10/17 10/19   Manual traction        Joint work Hip lat distraction and Mulligan IR mob Hip lat distraction and Mulligan IR mob MM Hip lat distraction and Mulligan IR mob MM     Pelvic rocking     sj sj   ST Piriformis TpR Piriformis TpR Piriformis TpR Piriformis TpR Piriformis TpR   Neuro Re-Ed        TA  5"x20 5"x20 5"x20 5"x20   TA+march  GTB x20 ea GTB x20 ea GTB x20 ea GTB x20 ea   birddog                                        Ther Ex        NuStep/TM  L1 10' L1 10' L1 10' L1 10'   clamshells  GTB 5"x20 GTB 5"x20 GTB 5"x20 GTB 5"x20   Hip ext  nv nv 2x10 ea Gtb 2x10 ea   Leg press        Ham curls        Knee ext        Ankle strength - band        Standing HR  nv x30 x30 X30   bridge x15 x20 x20 x20 x20   Lumbar flex        Supine hand to knee x10 ea 5" x10 ea 5" x15 ea 5" x15 ea 5" x15 ea 5"   SKTC HEP 10"x10 10"x10 10"x10 10"x10   LTR HEP x20 x20 x20 x20   Supine 90/90 x10 x10 ea x10 ea x10 ea x10 ea   Swiss ball iso  5"x20 5"x20 5"x20 5"x20   Piriformis stretch HEP 30"x4 ea 30"x4 ea 30"x4 ea 30"x4 ea   education Provided written HEP and reviewed with pt       Ther Activity                        Gait Training                        Modalities                          Access Code: [email or web address]  URL: [email or web address]  Date: 10/05/2023  Prepared by: Luis Carlos Smith    Exercises  - Supine 90/90 Sciatic Nerve Glide with Knee Flexion/Extension  - 1 x daily - 7 x weekly - 3 sets - 10 reps  - Supine Bridge  - 1 x daily - 7 x weekly - 3 sets - 10 reps  - Supine Alternating Knee Taps with Hands  - 1 x daily - 7 x weekly - 3 sets - 10 reps  - Supine Lower Trunk Rotation  - 1 x daily - 7 x weekly - 3 sets - 10 reps  - Hooklying Single Knee to Chest Stretch  - 1 x daily - 7 x weekly - 3 sets - 10 reps

## 2023-10-24 ENCOUNTER — OFFICE VISIT (OUTPATIENT)
Dept: PHYSICAL THERAPY | Facility: CLINIC | Age: 68
End: 2023-10-24
Payer: MEDICARE

## 2023-10-24 DIAGNOSIS — M54.51 VERTEBROGENIC LOW BACK PAIN: Primary | ICD-10-CM

## 2023-10-24 PROCEDURE — 97140 MANUAL THERAPY 1/> REGIONS: CPT

## 2023-10-24 PROCEDURE — 97110 THERAPEUTIC EXERCISES: CPT

## 2023-10-24 NOTE — PROGRESS NOTES
Daily Note     Today's date: 10/24/2023  Patient name: Evan Orozco  : 1955  MRN: [id number]  Referring provider: Martha Mar DO  Dx:   Encounter Diagnosis     ICD-10-CM    1. Vertebrogenic low back pain  M54.51                      Subjective: Patient reports back is steadily improving in regards to moving around. Objective: See treatment diary below      Assessment: Evan Orozco tolerated treatment well. Tenderness to palpation continues but function is improving. Continued treatment indicated. Plan: Continue per plan of care.       Precautions: chronic LBP    Daily Treatment Diary:      Initial Evaluation Date: 10/05/23  POC Expiration: 23  Compliance 10/05/23  10/9  10/11  10/17  10/19  10/24           Visit Number 1 2  3  4  5  6           Re-Eval  IE                 MC   Foto Captured Y                      Manuals 10/24  10/11 10/17 10/19   Manual traction        Joint work   Hip lat distraction and Mulligan IR mob MM     Pelvic rocking     sj sj   ST Piriformis TpR  Piriformis TpR Piriformis TpR Piriformis TpR   Neuro Re-Ed        TA 5"x20  5"x20 5"x20 5"x20   TA+march GTB x20 ea  GTB x20 ea GTB x20 ea GTB x20 ea   birddog NV       Theraband DLS extension NV       Pallof press NV                       Ther Ex        NuStep/TM L1 10'  L1 10' L1 10' L1 10'   clamshells GTB 5"x20 SL GTB 5"x20 GTB 5"x20 GTB 5"x20   Hip ext Gtb 2x10 ea  nv 2x10 ea Gtb 2x10 ea   Leg press        Ham curls        Knee ext        Ankle strength - band        Standing HR x30  x30 x30 X30   bridge x20  x20 x20 x20   Lumbar flex        Supine hand to knee 5"x20  x15 ea 5" x15 ea 5" 5"x20   SKTC 10"x10  10"x10 10"x10 10"x10   LTR x20  x20 x20 x20   Supine 90/90 X10 ea  x10 ea x10 ea x10 ea   Swiss ball iso 5"x20  5"x20 5"x20 5"x20   Piriformis stretch 30"x4 ea  30"x4 ea 30"x4 ea 30"x4 ea   education        Ther Activity                        Gait Training                        Modalities Access Code: [email or web address]  URL: [email or web address]  Date: 10/05/2023  Prepared by: Mayra Rivera    Exercises  - Supine 90/90 Sciatic Nerve Glide with Knee Flexion/Extension  - 1 x daily - 7 x weekly - 3 sets - 10 reps  - Supine Bridge  - 1 x daily - 7 x weekly - 3 sets - 10 reps  - Supine Alternating Knee Taps with Hands  - 1 x daily - 7 x weekly - 3 sets - 10 reps  - Supine Lower Trunk Rotation  - 1 x daily - 7 x weekly - 3 sets - 10 reps  - Hooklying Single Knee to Chest Stretch  - 1 x daily - 7 x weekly - 3 sets - 10 reps

## 2023-10-26 ENCOUNTER — OFFICE VISIT (OUTPATIENT)
Dept: PHYSICAL THERAPY | Facility: CLINIC | Age: 68
End: 2023-10-26
Payer: MEDICARE

## 2023-10-26 DIAGNOSIS — M54.51 VERTEBROGENIC LOW BACK PAIN: Primary | ICD-10-CM

## 2023-10-26 PROCEDURE — 97110 THERAPEUTIC EXERCISES: CPT

## 2023-10-26 PROCEDURE — 97140 MANUAL THERAPY 1/> REGIONS: CPT

## 2023-10-26 NOTE — PROGRESS NOTES
Daily Note     Today's date: 10/26/2023  Patient name: Robbie Spatz  : 1955  MRN: [id number]  Referring provider: Phyllis Bliss DO  Dx:   Encounter Diagnosis     ICD-10-CM    1. Vertebrogenic low back pain  M54.51                      Subjective: Patient notes that his back is sore from participating in bowling last night. Objective: See treatment diary below      Assessment: Patient responded well to the incorporation of resistance training into the POC. Patient would benefit from further therapeutic exercises to increase functional mobility, lumbar and abdominal strength, and improve ability to tolerate his hobbies of hunting and bowling. Plan: Continue per plan of care.       Precautions: chronic LBP    Daily Treatment Diary:      Initial Evaluation Date: 10/05/23  POC Expiration: 23  Compliance 10/05/23  10/9  10/11  10/17  10/19  10/24           Visit Number 1 2  3  4  5  6           Re-Eval  IE                 MC   Foto Captured Y                      Manuals 10/24 10/26 10/11 10/17 10/19   Manual traction        Joint work   Hip lat distraction and Mulligan IR mob MM     Pelvic rocking     sj sj   ST Piriformis TpR  Piriformis TpR Piriformis TpR Piriformis TpR   Neuro Re-Ed        TA 5"x20  5"x20 5"x20 5"x20   TA+march GTB x20 ea  GTB x20 ea GTB x20 ea GTB x20 ea   Birddog on swiss ball NV 2x10       Theraband DLS extension NV       Pallof press NV                       Ther Ex        NuStep/TM L1 10' L1 10' L1 10' L1 10' L1 10'   clamshells GTB 5"x20 HEP GTB 5"x20 GTB 5"x20 GTB 5"x20   Hip ext Gtb 2x10 ea Gtb 2x10 ea nv 2x10 ea Gtb 2x10 ea   Leg press        Ham curls        Knee ext        Ankle strength - band        Standing HR x30 x30 x30 x30 X30   bridge x20 x20 x20 x20 x20   Lumbar flex        Supine hand to knee 5"x20 5"x20 x15 ea 5" x15 ea 5" 5"x20   SKTC 10"x10 10"x10 10"x10 10"x10 10"x10   LTR x20 x20 x20 x20 x20   Supine 90/90 X10 ea X10 ea x10 ea x10 ea x10 ea   Swiss ball iso 5"x20 - 5"x20 5"x20 5"x20   Piriformis stretch 30"x4 ea  30"x4 ea 30"x4 ea 30"x4 ea   education        Ther Activity                        Gait Training                        Modalities                          Access Code: [email or web address]  URL: [email or web address]  Date: 10/05/2023  Prepared by: Nigel Singh    Exercises  - Supine 90/90 Sciatic Nerve Glide with Knee Flexion/Extension  - 1 x daily - 7 x weekly - 3 sets - 10 reps  - Supine Bridge  - 1 x daily - 7 x weekly - 3 sets - 10 reps  - Supine Alternating Knee Taps with Hands  - 1 x daily - 7 x weekly - 3 sets - 10 reps  - Supine Lower Trunk Rotation  - 1 x daily - 7 x weekly - 3 sets - 10 reps  - Hooklying Single Knee to Chest Stretch  - 1 x daily - 7 x weekly - 3 sets - 10 reps

## 2023-10-31 ENCOUNTER — OFFICE VISIT (OUTPATIENT)
Dept: PHYSICAL THERAPY | Facility: CLINIC | Age: 68
End: 2023-10-31
Payer: MEDICARE

## 2023-10-31 DIAGNOSIS — M54.51 VERTEBROGENIC LOW BACK PAIN: Primary | ICD-10-CM

## 2023-10-31 PROCEDURE — 97110 THERAPEUTIC EXERCISES: CPT

## 2023-10-31 NOTE — PROGRESS NOTES
Daily Note     Today's date: 10/31/2023  Patient name: Idalia Marx  : 1955  MRN: [id number]  Referring provider: Erna Liu DO  Dx:   Encounter Diagnosis     ICD-10-CM    1. Vertebrogenic low back pain  M54.51                      Subjective: Patient reports soreness mostly at night and in the morning but during the day it has been doing well once he gets moving. Notes he is able to navigate in the woods a lot easier over the last month. Objective: See treatment diary below      Assessment: Patient continues to respond well to his current program. Increased RLE soreness was present with 90/90 exercise. Patient would benefit from further therapeutic exercises to increase functional mobility, lumbar and abdominal strength, and improve ability to tolerate his hobbies of hunting and bowling. Plan: Continue per plan of care.       Precautions: chronic LBP    Daily Treatment Diary:      Initial Evaluation Date: 10/05/23  POC Expiration: 23  Compliance 10/05/23  10/9  10/11  10/17  10/19  10/24  10/31         Visit Number 1 2  3  4  5  6  7         Re-Eval  IE                 MC   Foto Captured Y                      Manuals 10/24 10/26 10/31  10/19   Manual traction        Joint work        Pelvic rocking      sj   ST Piriformis TpR    Piriformis TpR   Neuro Re-Ed        TA 5"x20    5"x20   TA+march GTB x20 ea    GTB x20 ea   Birddog on swiss ball NV 2x10  2x10     Theraband DLS extension NV       Pallof press NV  GTB x20                     Ther Ex        NuStep/TM L1 10' L1 10' L1 10'  L1 10'   clamshells GTB 5"x20 HEP HEP  GTB 5"x20   Hip ext Gtb 2x10 ea Gtb 2x10 ea Gtb 2x10 ea  Gtb 2x10 ea   Leg press        Ham curls        Knee ext        Ankle strength - band        Standing HR x30 x30 x30  X30   bridge x20 x20 x20  x20   Lumbar flex        Supine hand to knee 5"x20 5"x20 5"x20  5"x20   SKTC 10"x10 10"x10 10"x10  10"x10   LTR x20 x20 x20  x20   Supine 90/90 X10 ea X10 ea X10 ea  x10 ea   [place] ball iso 5"x20 -   5"x20   Piriformis stretch 30"x4 ea    30"x4 ea   education        Ther Activity                        Gait Training                        Modalities                          Access Code: [email or web address]  URL: [email or web address]  Date: 10/05/2023  Prepared by: Janice Cao    Exercises  - Supine 90/90 Sciatic Nerve Glide with Knee Flexion/Extension  - 1 x daily - 7 x weekly - 3 sets - 10 reps  - Supine Bridge  - 1 x daily - 7 x weekly - 3 sets - 10 reps  - Supine Alternating Knee Taps with Hands  - 1 x daily - 7 x weekly - 3 sets - 10 reps  - Supine Lower Trunk Rotation  - 1 x daily - 7 x weekly - 3 sets - 10 reps  - Hooklying Single Knee to Chest Stretch  - 1 x daily - 7 x weekly - 3 sets - 10 reps

## 2023-11-02 ENCOUNTER — APPOINTMENT (OUTPATIENT)
Dept: PHYSICAL THERAPY | Facility: CLINIC | Age: 68
End: 2023-11-02
Payer: MEDICARE

## 2023-11-07 ENCOUNTER — OFFICE VISIT (OUTPATIENT)
Dept: PHYSICAL THERAPY | Facility: CLINIC | Age: 68
End: 2023-11-07
Payer: MEDICARE

## 2023-11-07 DIAGNOSIS — M54.51 VERTEBROGENIC LOW BACK PAIN: Primary | ICD-10-CM

## 2023-11-07 PROCEDURE — 97140 MANUAL THERAPY 1/> REGIONS: CPT

## 2023-11-07 PROCEDURE — 97110 THERAPEUTIC EXERCISES: CPT

## 2023-11-07 NOTE — PROGRESS NOTES
Daily Note     Today's date: 2023  Patient name: Gómez Steen  : 1955  MRN: [id number]  Referring provider: Elpidio Bello DO  Dx:   Encounter Diagnosis     ICD-10-CM    1. Vertebrogenic low back pain  M54.51                      Subjective: Patient reports he only has discomfort upon waking. Reports as he walks around all discomfort resolves. Objective: See treatment diary below      Assessment: Gómez Steen appears to have made good progress in PT. He has a good understanding of HEP and progression. Discomfort in AM prior to moving around similar to arthritic symptoms and should resolve with continued use of HEP. Plan: Potential discharge next visit.      Precautions: chronic LBP    Daily Treatment Diary:      Initial Evaluation Date: 10/05/23  POC Expiration: 23  Compliance 10/05/23  10/9  10/11  10/17  10/19  10/24  10/31  11/7       Visit Number 1 2  3  4  5  6  7  8       Re-Eval  IE                 [facility]   Foto Captured Y                      Manuals 10/24 10/26 10/31 11/7 10/19   Manual traction        Joint work        Pelvic rocking      sj   ST Piriformis TpR   Piriformis TpR Piriformis TpR   Neuro Re-Ed        TA 5"x20   5"x20 5"x20   TA+march GTB x20 ea   BTB x20 ea GTB x20 ea   Birddog on swiss ball NV 2x10  2x10 2x10    Theraband DLS extension NV       Pallof press NV  GTB x20 GTB x20                    Ther Ex        NuStep/TM L1 10' L1 10' L1 10' L1 10' L1 10'   clamshells GTB 5"x20 HEP HEP - GTB 5"x20   Hip ext Gtb 2x10 ea Gtb 2x10 ea Gtb 2x10 ea Btb 2x10 ea Gtb 2x10 ea   Leg press        Ham curls        Knee ext        Ankle strength - band        Standing HR x30 x30 x30 x30 X30   bridge x20 x20 x20 x20 x20   Lumbar flex        Supine hand to knee 5"x20 5"x20 5"x20 5"x20 5"x20   SKTC 10"x10 10"x10 10"x10 10"x10 10"x10   LTR x20 x20 x20 x20 x20   Supine 90/90 X10 ea X10 ea X10 ea X10 ea x10 ea   Swiss ball iso 5"x20 -  5"x20 5"x20   Piriformis stretch 30"x4 ea 30"x4 ea   education        Ther Activity                        Gait Training                        Modalities                          Access Code: [email or web address]  URL: [email or web address]  Date: 10/05/2023  Prepared by: Pierce Severs    Exercises  - Supine 90/90 Sciatic Nerve Glide with Knee Flexion/Extension  - 1 x daily - 7 x weekly - 3 sets - 10 reps  - Supine Bridge  - 1 x daily - 7 x weekly - 3 sets - 10 reps  - Supine Alternating Knee Taps with Hands  - 1 x daily - 7 x weekly - 3 sets - 10 reps  - Supine Lower Trunk Rotation  - 1 x daily - 7 x weekly - 3 sets - 10 reps  - Hooklying Single Knee to Chest Stretch  - 1 x daily - 7 x weekly - 3 sets - 10 reps

## 2023-11-09 ENCOUNTER — OFFICE VISIT (OUTPATIENT)
Dept: PHYSICAL THERAPY | Facility: CLINIC | Age: 68
End: 2023-11-09
Payer: MEDICARE

## 2023-11-09 DIAGNOSIS — M54.51 VERTEBROGENIC LOW BACK PAIN: Primary | ICD-10-CM

## 2023-11-09 PROCEDURE — 97140 MANUAL THERAPY 1/> REGIONS: CPT

## 2023-11-09 PROCEDURE — 97110 THERAPEUTIC EXERCISES: CPT

## 2023-11-09 NOTE — PROGRESS NOTES
Daily Note/Discharge Summary     Today's date: 2023  Patient name: Ines Harden  : 1955  MRN: [id number]  Referring provider: Colleen Loyd DO  Dx:   Encounter Diagnosis     ICD-10-CM    1. Vertebrogenic low back pain  M54.51                      Subjective: Patient reports mornings are stiff but works out while he walks around. Objective: See treatment diary below      Assessment: Ines Harden has made good progress in PT. He has a good understanding of HEP and progression. Should do well with D/C to HEP. Plan:  D/C patient to HEP as per PT POC.      Precautions: chronic LBP    Daily Treatment Diary:      Initial Evaluation Date: 10/05/23  POC Expiration: 23  Compliance 10/05/23  10/9  10/11  10/17  10/19  10/24  10/31  11/7  11/7  11/9   Visit Number 1 2  3  4  5  6  7  8  9  10   Re-Eval  IE                 [facility]   Foto Captured Y                      Manuals 10/24 10/26 10/31 11/7 11/9   Manual traction        Joint work        Pelvic rocking         ST Piriformis TpR   Piriformis TpR    Neuro Re-Ed        TA 5"x20   5"x20 5"x20   TA+march GTB x20 ea   BTB x20 ea BTB x20 ea   Birddog on swiss ball NV 2x10  2x10 2x10 2x10   Theraband DLS extension NV       Pallof press NV  GTB x20 GTB x20 GTB x20                   Ther Ex        NuStep/TM L1 10' L1 10' L1 10' L1 10' L1 10'   clamshells GTB 5"x20 HEP HEP - -   Hip ext Gtb 2x10 ea Gtb 2x10 ea Gtb 2x10 ea Btb 2x10 ea Btb 2x10 ea   Leg press        Ham curls        Knee ext        Ankle strength - band        Standing HR x30 x30 x30 x30 x30   bridge x20 x20 x20 x20 x20   Lumbar flex        Supine hand to knee 5"x20 5"x20 5"x20 5"x20 5"x20   SKTC 10"x10 10"x10 10"x10 10"x10 10"x10   LTR x20 x20 x20 x20 x20   Supine 90/90 X10 ea X10 ea X10 ea X10 ea X10 ea   Swiss ball iso 5"x20 -  5"x20 5"x20   Piriformis stretch 30"x4 ea       education        Ther Activity                        Gait Training                        Modalities Access Code: [email or web address]  URL: [email or web address]  Date: 10/05/2023  Prepared by: Jania Seymour    Exercises  - Supine 90/90 Sciatic Nerve Glide with Knee Flexion/Extension  - 1 x daily - 7 x weekly - 3 sets - 10 reps  - Supine Bridge  - 1 x daily - 7 x weekly - 3 sets - 10 reps  - Supine Alternating Knee Taps with Hands  - 1 x daily - 7 x weekly - 3 sets - 10 reps  - Supine Lower Trunk Rotation  - 1 x daily - 7 x weekly - 3 sets - 10 reps  - Hooklying Single Knee to Chest Stretch  - 1 x daily - 7 x weekly - 3 sets - 10 reps

## 2024-07-19 ENCOUNTER — OFFICE VISIT (OUTPATIENT)
Dept: URGENT CARE | Facility: CLINIC | Age: 69
End: 2024-07-19
Payer: MEDICARE

## 2024-07-19 VITALS
TEMPERATURE: 97.3 F | HEART RATE: 50 BPM | HEIGHT: 71 IN | WEIGHT: 235 LBS | OXYGEN SATURATION: 98 % | DIASTOLIC BLOOD PRESSURE: 72 MMHG | SYSTOLIC BLOOD PRESSURE: 164 MMHG | BODY MASS INDEX: 32.9 KG/M2 | RESPIRATION RATE: 18 BRPM

## 2024-07-19 DIAGNOSIS — L03.312 CELLULITIS OF BACK EXCEPT BUTTOCK: Primary | ICD-10-CM

## 2024-07-19 PROCEDURE — 99213 OFFICE O/P EST LOW 20 MIN: CPT

## 2024-07-19 PROCEDURE — G0463 HOSPITAL OUTPT CLINIC VISIT: HCPCS

## 2024-07-19 RX ORDER — PRIMIDONE 50 MG/1
TABLET ORAL
COMMUNITY
Start: 2024-07-10 | End: 2024-10-08

## 2024-07-19 RX ORDER — ATORVASTATIN CALCIUM 20 MG/1
TABLET, FILM COATED ORAL
COMMUNITY
Start: 2024-06-12

## 2024-07-19 RX ORDER — CEPHALEXIN 500 MG/1
500 CAPSULE ORAL EVERY 8 HOURS SCHEDULED
Qty: 21 CAPSULE | Refills: 0 | Status: SHIPPED | OUTPATIENT
Start: 2024-07-19 | End: 2024-07-26

## 2024-07-19 NOTE — PATIENT INSTRUCTIONS
Your exam shows cellulitis, an infection of the skin, in the area of your right lower back.  Prescribed course of Keflex, take as directed.  Apply ice to area.  Over the counter pain medication as directed on packaging as needed for pain (ex: Tylenol, ibuprofen).    Follow up with PCP in 3-5 days.  Proceed to  ER if symptoms worsen.    If tests are performed, our office will contact you with results only if changes need to made to the care plan discussed with you at the visit. You can review your full results on [facility].    Patient Education     Cellulitis and erysipelas (skin infections)   The Basics   Written by the doctors and editors at [facility]   What are cellulitis and erysipelas? -- Cellulitis and erysipelas are both infections of the skin. These infections can cause redness, pain, and swelling. The difference between them is that erysipelas tends to affect the upper layers of skin, and cellulitis tends to affect deep layers of skin and sometimes the fat under the skin.  Cellulitis and erysipelas can happen when germs get into the skin. Normally, different types of germs live on a person's skin. Most of the time, these germs do not cause any problems. But if a person gets a cut or a break in the skin, the germs can get into the skin and cause an infection.  Certain conditions can increase a person's chance of getting cellulitis or erysipelas. These include:   Having a cut (even a tiny one)   Having another type of skin infection or a long-term skin condition   Having swelling of the skin or swelling in the body   Being overweight  What are the symptoms of cellulitis and erysipelas? -- Both types of infection cause very similar symptoms. Either infection can cause the infected area to be painful, red, swollen, or warm. Some people with cellulitis or erysipelas can sometimes also have fever or chills. And sometimes, people with these infections have no symptoms or only some of these symptoms.  Most  "of the time, cellulitis and erysipelas happen on the legs or arms. But people can get these infections in other places, such as the belly, face, in the mouth, or around the anus.  Will I need tests? -- Most people do not need any tests. Your doctor or nurse will do an exam and look at your skin.  It's important for a doctor or nurse to do an exam to figure out what kind of infection you have. The right treatment for a skin infection depends on the type of infection it is and which germs are causing it. Your doctor or nurse might need to do tests to figure out the cause of your infection.  If you have cellulitis or erysipelas, it's important to get treated. These infections can spread to the whole body and become serious if not treated.  How are cellulitis and erysipelas treated? -- These infections are treated with antibiotic pills. If your doctor prescribes medicine for you to take at home, it is important to follow the directions exactly. Take all of the pills you are given, even if you feel better before you finish them. If you do not take all the pills, the infection can come back worse.  People who have severe infections might be treated in the hospital and given antibiotics through a thin tube that goes into a vein, called an \"IV\".  What can I do to help treat my infection? -- You can:   Raise your arm or leg (if your infection is on your arm or leg) to reduce swelling - Raise the arm or leg up above the level of your heart 3 or 4 times a day, for 30 minutes each time.   Keep the infected area clean and dry - You can take a shower or bath, but be sure to pat the area dry with a towel afterward.  Antibiotic ointments or creams do not work to treat cellulitis and erysipelas.  Should I call my doctor or nurse? -- You should call your doctor or nurse if your symptoms do not get better within 3 days of starting treatment. You should also call if the affected area gets:   Bigger   More swollen   More painful  Your " doctor or nurse might do another exam or tests to see if you need different medicines.  Can skin infections be prevented? -- Sometimes. If you cut your skin, make sure to wash the area well with soap and water. This can help prevent the area from getting infected. If you have a long-term skin condition, ask your doctor or nurse what you can do to help prevent infections.  All topics are updated as new evidence becomes available and our peer review process is complete.  This topic retrieved from [employer] on: Feb 26, 2024.  Topic 05673 Version 15.0  Release: 32.2.4 - C32.56  © 2024 UpToDate, Inc. and/or its affiliates. All rights reserved.  Consumer Information Use and Disclaimer   Disclaimer: This generalized information is a limited summary of diagnosis, treatment, and/or medication information. It is not meant to be comprehensive and should be used as a tool to help the user understand and/or assess potential diagnostic and treatment options. It does NOT include all information about conditions, treatments, medications, side effects, or risks that may apply to a specific patient. It is not intended to be medical advice or a substitute for the medical advice, diagnosis, or treatment of a health care provider based on the health care provider's examination and assessment of a patient's specific and unique circumstances. Patients must speak with a health care provider for complete information about their health, medical questions, and treatment options, including any risks or benefits regarding use of medications. This information does not endorse any treatments or medications as safe, effective, or approved for treating a specific patient. UpToDate, Inc. and its affiliates disclaim any warranty or liability relating to this information or the use thereof.The use of this information is governed by the Terms of Use, available at https://www.wolterszipcodemailer.comuwer.com/en/know/clinical-effectiveness-terms. 2024© UpToDate, Inc.  and its affiliates and/or licensors. All rights reserved.  Copyright   © 2024 [employer] and/or its affiliates. All rights reserved.

## 2024-07-19 NOTE — PROGRESS NOTES
[facility]        NAME: Kt Horowitz is a 68 y.o. male  : 1955    MRN: [id number]  DATE: 2024  TIME: 10:36 AM    Assessment and Plan   Cellulitis of back except buttock [L03.312]  1. Cellulitis of back except buttock  cephalexin (KEFLEX) 500 mg capsule        Patient with history of chronic low back pain worsening in past 2 days. Upon examination, right low back with significant erythema, warmth, tenderness. Small cut noted to center of redness. Presentation appears to be more consistent with cellulitis than low back pain. Prescribed course of Keflex, advised symptomatic treatment and strict ER precautions.    Patient Instructions     Your exam shows cellulitis, an infection of the skin, in the area of your right lower back.  Prescribed course of Keflex, take as directed.  Apply ice to area.  Over the counter pain medication as directed on packaging as needed for pain (ex: Tylenol, ibuprofen).    Follow up with PCP in 3-5 days.  Proceed to  ER if symptoms worsen.    If tests are performed, our office will contact you with results only if changes need to made to the care plan discussed with you at the visit. You can review your full results on [facility].    Chief Complaint     Chief Complaint   Patient presents with    Back Pain     Has chronic lower back pain. Pain getting worse last 2 days on right lower back. Ice not helping.          History of Present Illness       Back Pain  This is a chronic (with current acute episode) problem. Episode onset: worsening last 3 days, no inciting injury. The problem has been gradually worsening since onset. The pain is present in the lumbar spine (mainly right side). The pain does not radiate. The pain is at a severity of 9/10. Pertinent negatives include no abdominal pain, bladder incontinence, bowel incontinence, chest pain, dysuria, fever, headaches, numbness, paresis, paresthesias, pelvic pain, perianal numbness or tingling. (+  swelling, redness, tender to touch on right side of back) Treatments tried: PT home exercises, ice, ibuprofen. The treatment provided no relief.       Review of Systems   Review of Systems   Constitutional:  Negative for fever.   Cardiovascular:  Negative for chest pain.   Gastrointestinal:  Negative for abdominal pain and bowel incontinence.   Genitourinary:  Negative for bladder incontinence, dysuria, pelvic pain and urgency.   Musculoskeletal:  Positive for back pain.   Neurological:  Negative for tingling, numbness, headaches and paresthesias.         Current Medications       Current Outpatient Medications:     albuterol (Proventil HFA) 90 mcg/act inhaler, Inhale 2 puffs every 4 (four) hours as needed for wheezing or shortness of breath, Disp: 18 g, Rfl: 0    amLODIPine (NORVASC) 10 mg tablet, 25 mg , Disp: , Rfl:     aspirin 81 mg chewable tablet, Chew 81 mg daily, Disp: , Rfl:     atorvastatin (LIPITOR) 20 mg tablet, , Disp: , Rfl:     cephalexin (KEFLEX) 500 mg capsule, Take 1 capsule (500 mg total) by mouth every 8 (eight) hours for 7 days, Disp: 21 capsule, Rfl: 0    hydrochlorothiazide (HYDRODIURIL) 25 mg tablet, , Disp: , Rfl:     losartan (COZAAR) 50 mg tablet, , Disp: , Rfl:     primidone (MYSOLINE) 50 mg tablet, TAKE 1 TABLET (50 MG) BY MOUTH 2 (TWO) TIMES A DAY., Disp: , Rfl:     propranolol (INDERAL LA) 60 mg 24 hr capsule, , Disp: , Rfl:     atorvastatin (LIPITOR) 10 mg tablet, , Disp: , Rfl:     benzonatate (TESSALON PERLES) 100 mg capsule, Take 1 capsule (100 mg total) by mouth 3 (three) times a day as needed for cough (Patient not taking: Reported on 1/30/2023), Disp: 20 capsule, Rfl: 0    meloxicam (MOBIC) 15 mg tablet, , Disp: , Rfl:     methocarbamol (ROBAXIN) 750 mg tablet, Take 1 tablet (750 mg total) by mouth every 6 (six) hours as needed for muscle spasms for up to 5 days (Patient not taking: Reported on 7/19/2024), Disp: 20 tablet, Rfl: 0    predniSONE 10 mg tablet, Take 1 tablet (10 mg  "total) by mouth daily Take 6 on day 1, take 5 on day 2, take 4 on day 3, take 3 on day 4, take 2 on day 5, take 1 on day 6. (Patient not taking: Reported on 7/19/2024), Disp: 21 tablet, Rfl: 0    Current Allergies     Allergies as of 07/19/2024 - Reviewed 07/19/2024   Allergen Reaction Noted    Adhesive [medical tape] Rash 03/12/2020    Other Rash 03/16/2015            The following portions of the patient's history were reviewed and updated as appropriate: allergies, current medications, past family history, past medical history, past social history, past surgical history and problem list.     Past Medical History:   Diagnosis Date    Ankle injury     Arthritis     High cholesterol     Hypertension        Past Surgical History:   Procedure Laterality Date    ADENOIDECTOMY      CARPAL TUNNEL RELEASE Bilateral     JOINT REPLACEMENT      MENISCECTOMY Bilateral     REPLACEMENT TOTAL KNEE Left     TONSILLECTOMY         Family History   Problem Relation Age of Onset    Heart disease Mother     Arthritis Mother     Heart disease Father          Medications have been verified.        Objective   /72   Pulse (!) 50   Temp (!) 97.3 °F (36.3 °C)   Resp 18   Ht 5' 11\" (1.803 m)   Wt 107 kg (235 lb)   SpO2 98%   BMI 32.78 kg/m²        Physical Exam     Physical Exam  Vitals and nursing note reviewed.   Constitutional:       General: He is not in acute distress.     Appearance: Normal appearance. He is not ill-appearing.   Cardiovascular:      Rate and Rhythm: Normal rate and regular rhythm.      Pulses: Normal pulses.      Heart sounds: Normal heart sounds.   Pulmonary:      Effort: Pulmonary effort is normal.      Breath sounds: Normal breath sounds.   Musculoskeletal:      Comments: Low Back: Erythematous area of skin with small scabbed over wound in center. Warm and tender to touch. Swelling present. No tenderness over lumbar spine.   Neurological:      Mental Status: He is alert.          Media " Information      Document Information    Clinical Image - Mobile Device      07/19/2024 10:38   Attached To:   Office Visit on 7/19/24 with Ivett Melendez PA-C   Source Information    Ivett Melendez PA-C  [facility]   Document History                 - - -

## 2024-07-22 ENCOUNTER — APPOINTMENT (EMERGENCY)
Dept: CT IMAGING | Facility: HOSPITAL | Age: 69
End: 2024-07-22
Payer: MEDICARE

## 2024-07-22 ENCOUNTER — HOSPITAL ENCOUNTER (EMERGENCY)
Facility: HOSPITAL | Age: 69
Discharge: HOME | End: 2024-07-22
Attending: EMERGENCY MEDICINE
Payer: MEDICARE

## 2024-07-22 VITALS
OXYGEN SATURATION: 97 % | TEMPERATURE: 98 F | WEIGHT: 235 LBS | DIASTOLIC BLOOD PRESSURE: 77 MMHG | RESPIRATION RATE: 16 BRPM | BODY MASS INDEX: 32.78 KG/M2 | SYSTOLIC BLOOD PRESSURE: 162 MMHG | HEART RATE: 55 BPM

## 2024-07-22 DIAGNOSIS — T30.0 BURN: ICD-10-CM

## 2024-07-22 DIAGNOSIS — M54.50 LOW BACK PAIN: Primary | ICD-10-CM

## 2024-07-22 LAB
ALBUMIN SERPL BCG-MCNC: 4.3 G/DL (ref 3.5–5)
ALP SERPL-CCNC: 53 U/L (ref 34–104)
ALT SERPL W P-5'-P-CCNC: 18 U/L (ref 7–52)
ANION GAP SERPL CALCULATED.3IONS-SCNC: 7 MMOL/L (ref 4–13)
AST SERPL W P-5'-P-CCNC: 18 U/L (ref 13–39)
BASOPHILS # BLD AUTO: 0.07 THOUSANDS/ÂΜL (ref 0–0.1)
BASOPHILS NFR BLD AUTO: 1 % (ref 0–1)
BILIRUB SERPL-MCNC: 0.75 MG/DL (ref 0.2–1)
BILIRUB UR QL STRIP: NEGATIVE
BUN SERPL-MCNC: 17 MG/DL (ref 5–25)
CALCIUM SERPL-MCNC: 9.8 MG/DL (ref 8.4–10.2)
CHLORIDE SERPL-SCNC: 103 MMOL/L (ref 96–108)
CLARITY UR: CLEAR
CO2 SERPL-SCNC: 27 MMOL/L (ref 21–32)
COLOR UR: YELLOW
CREAT SERPL-MCNC: 0.98 MG/DL (ref 0.6–1.3)
EOSINOPHIL # BLD AUTO: 0.24 THOUSAND/ÂΜL (ref 0–0.61)
EOSINOPHIL NFR BLD AUTO: 3 % (ref 0–6)
ERYTHROCYTE [DISTWIDTH] IN BLOOD BY AUTOMATED COUNT: 13.2 % (ref 11.6–15.1)
GFR SERPL CREATININE-BSD FRML MDRD: 78 ML/MIN/1.73SQ M
GLUCOSE SERPL-MCNC: 132 MG/DL (ref 65–140)
GLUCOSE UR STRIP-MCNC: NEGATIVE MG/DL
HCT VFR BLD AUTO: 47.2 % (ref 36.5–49.3)
HGB BLD-MCNC: 15.9 G/DL (ref 12–17)
HGB UR QL STRIP.AUTO: NEGATIVE
IMM GRANULOCYTES # BLD AUTO: 0.04 THOUSAND/UL (ref 0–0.2)
IMM GRANULOCYTES NFR BLD AUTO: 1 % (ref 0–2)
KETONES UR STRIP-MCNC: NEGATIVE MG/DL
LEUKOCYTE ESTERASE UR QL STRIP: NEGATIVE
LYMPHOCYTES # BLD AUTO: 1.07 THOUSANDS/ÂΜL (ref 0.6–4.47)
LYMPHOCYTES NFR BLD AUTO: 14 % (ref 14–44)
MCH RBC QN AUTO: 30.9 PG (ref 26.8–34.3)
MCHC RBC AUTO-ENTMCNC: 33.7 G/DL (ref 31.4–37.4)
MCV RBC AUTO: 92 FL (ref 82–98)
MONOCYTES # BLD AUTO: 0.54 THOUSAND/ÂΜL (ref 0.17–1.22)
MONOCYTES NFR BLD AUTO: 7 % (ref 4–12)
NEUTROPHILS # BLD AUTO: 5.81 THOUSANDS/ÂΜL (ref 1.85–7.62)
NEUTS SEG NFR BLD AUTO: 74 % (ref 43–75)
NITRITE UR QL STRIP: NEGATIVE
NRBC BLD AUTO-RTO: 0 /100 WBCS
PH UR STRIP.AUTO: 5.5 [PH]
PLATELET # BLD AUTO: 223 THOUSANDS/UL (ref 149–390)
PMV BLD AUTO: 9.3 FL (ref 8.9–12.7)
POTASSIUM SERPL-SCNC: 4 MMOL/L (ref 3.5–5.3)
PROT SERPL-MCNC: 7.5 G/DL (ref 6.4–8.4)
PROT UR STRIP-MCNC: NEGATIVE MG/DL
RBC # BLD AUTO: 5.15 MILLION/UL (ref 3.88–5.62)
SODIUM SERPL-SCNC: 137 MMOL/L (ref 135–147)
SP GR UR STRIP.AUTO: 1.02 (ref 1–1.03)
UROBILINOGEN UR QL STRIP.AUTO: 0.2 E.U./DL
WBC # BLD AUTO: 7.77 THOUSAND/UL (ref 4.31–10.16)

## 2024-07-22 PROCEDURE — 99283 EMERGENCY DEPT VISIT LOW MDM: CPT

## 2024-07-22 PROCEDURE — 85025 COMPLETE CBC W/AUTO DIFF WBC: CPT | Performed by: EMERGENCY MEDICINE

## 2024-07-22 PROCEDURE — 80053 COMPREHEN METABOLIC PANEL: CPT | Performed by: EMERGENCY MEDICINE

## 2024-07-22 PROCEDURE — 81003 URINALYSIS AUTO W/O SCOPE: CPT | Performed by: EMERGENCY MEDICINE

## 2024-07-22 PROCEDURE — 99284 EMERGENCY DEPT VISIT MOD MDM: CPT | Performed by: EMERGENCY MEDICINE

## 2024-07-22 PROCEDURE — 96372 THER/PROPH/DIAG INJ SC/IM: CPT

## 2024-07-22 PROCEDURE — 36415 COLL VENOUS BLD VENIPUNCTURE: CPT | Performed by: EMERGENCY MEDICINE

## 2024-07-22 PROCEDURE — 72131 CT LUMBAR SPINE W/O DYE: CPT

## 2024-07-22 RX ORDER — METHYLPREDNISOLONE 4 MG/1
TABLET ORAL
Qty: 21 TABLET | Refills: 0 | Status: SHIPPED | OUTPATIENT
Start: 2024-07-22

## 2024-07-22 RX ORDER — KETOROLAC TROMETHAMINE 30 MG/ML
30 INJECTION, SOLUTION INTRAMUSCULAR; INTRAVENOUS ONCE
Status: COMPLETED | OUTPATIENT
Start: 2024-07-22 | End: 2024-07-22

## 2024-07-22 RX ORDER — METHOCARBAMOL 500 MG/1
500 TABLET, FILM COATED ORAL 2 TIMES DAILY PRN
Qty: 20 TABLET | Refills: 0 | Status: SHIPPED | OUTPATIENT
Start: 2024-07-22

## 2024-07-22 RX ORDER — GINSENG 100 MG
1 CAPSULE ORAL 2 TIMES DAILY
Qty: 28 G | Refills: 0 | Status: SHIPPED | OUTPATIENT
Start: 2024-07-22 | End: 2024-08-05

## 2024-07-22 RX ORDER — TRAMADOL HYDROCHLORIDE 50 MG/1
50 TABLET ORAL EVERY 8 HOURS PRN
Qty: 15 TABLET | Refills: 0 | Status: SHIPPED | OUTPATIENT
Start: 2024-07-22 | End: 2024-08-01

## 2024-07-22 RX ORDER — KETOROLAC TROMETHAMINE 30 MG/ML
15 INJECTION, SOLUTION INTRAMUSCULAR; INTRAVENOUS ONCE
Status: DISCONTINUED | OUTPATIENT
Start: 2024-07-22 | End: 2024-07-22

## 2024-07-22 RX ADMIN — KETOROLAC TROMETHAMINE 30 MG: 30 INJECTION, SOLUTION INTRAMUSCULAR; INTRAVENOUS at 11:29

## 2024-07-22 NOTE — ED PROVIDER NOTES
"History  Chief Complaint   Patient presents with    Back Pain     Seen at xc Friday and given Abx for \"infection in his back from a scratch\" and told if pain get worse to come to the ED for evaluation.        History provided by:  Medical records and patient  Back Pain  Location:  Lumbar spine  Quality:  Aching  Radiates to: Right buttocks.  Pain severity:  Moderate  Pain is:  Same all the time  Onset quality:  Gradual  Duration:  1 week  Progression:  Unchanged  Chronicity:  Recurrent  Context comment:  History of chronic low back pain/sciatica, flareup of his right lower back pain that goes into his buttocks over the past week, presented to urgent care 3 days prior to arrival, was noted to have a wound to his right lower back, placed on Keflex.  Relieved by:  Nothing  Worsened by:  Ambulation, bending and twisting  Ineffective treatments: Keflex.  Associated symptoms: no abdominal pain, no abdominal swelling, no bladder incontinence, no bowel incontinence, no chest pain, no dysuria, no fever, no headaches, no leg pain, no numbness, no paresthesias, no pelvic pain, no perianal numbness, no tingling, no weakness and no weight loss        Prior to Admission Medications   Prescriptions Last Dose Informant Patient Reported? Taking?   albuterol (Proventil HFA) 90 mcg/act inhaler   No No   Sig: Inhale 2 puffs every 4 (four) hours as needed for wheezing or shortness of breath   amLODIPine (NORVASC) 10 mg tablet  Self Yes No   Si mg    aspirin 81 mg chewable tablet   Yes No   Sig: Chew 81 mg daily   atorvastatin (LIPITOR) 10 mg tablet   Yes No   Patient not taking: Reported on 2024   atorvastatin (LIPITOR) 20 mg tablet   Yes No   benzonatate (TESSALON PERLES) 100 mg capsule   No No   Sig: Take 1 capsule (100 mg total) by mouth 3 (three) times a day as needed for cough   Patient not taking: Reported on 2023   cephalexin (KEFLEX) 500 mg capsule   No No   Sig: Take 1 capsule (500 mg total) by mouth every 8 " (eight) hours for 7 days   hydrochlorothiazide (HYDRODIURIL) 25 mg tablet   Yes No   losartan (COZAAR) 50 mg tablet   Yes No   meloxicam (MOBIC) 15 mg tablet   Yes No   Patient not taking: Reported on 7/5/2021   methocarbamol (ROBAXIN) 750 mg tablet   No No   Sig: Take 1 tablet (750 mg total) by mouth every 6 (six) hours as needed for muscle spasms for up to 5 days   Patient not taking: Reported on 7/19/2024   predniSONE 10 mg tablet   No No   Sig: Take 1 tablet (10 mg total) by mouth daily Take 6 on day 1, take 5 on day 2, take 4 on day 3, take 3 on day 4, take 2 on day 5, take 1 on day 6.   Patient not taking: Reported on 7/19/2024   primidone (MYSOLINE) 50 mg tablet   Yes No   Sig: TAKE 1 TABLET (50 MG) BY MOUTH 2 (TWO) TIMES A DAY.   propranolol (INDERAL LA) 60 mg 24 hr capsule   Yes No      Facility-Administered Medications: None       Past Medical History:   Diagnosis Date    Ankle injury     Arthritis     High cholesterol     Hypertension        Past Surgical History:   Procedure Laterality Date    ADENOIDECTOMY      CARPAL TUNNEL RELEASE Bilateral     JOINT REPLACEMENT      MENISCECTOMY Bilateral     REPLACEMENT TOTAL KNEE Left     TONSILLECTOMY         Family History   Problem Relation Age of Onset    Heart disease Mother     Arthritis Mother     Heart disease Father      I have reviewed and agree with the history as documented.    E-Cigarette/Vaping    E-Cigarette Use Never User      E-Cigarette/Vaping Substances    Nicotine No     THC No     CBD No     Flavoring No     Other No     Unknown No      Social History     Tobacco Use    Smoking status: Never    Smokeless tobacco: Current     Types: Chew   Vaping Use    Vaping status: Never Used   Substance Use Topics    Alcohol use: Yes     Comment: occassionally    Drug use: Never       Review of Systems   Constitutional:  Negative for appetite change, chills, fatigue, fever and weight loss.   HENT:  Negative for ear pain, rhinorrhea, sore throat and trouble  swallowing.    Eyes:  Negative for pain, discharge and visual disturbance.   Respiratory:  Negative for cough, chest tightness and shortness of breath.    Cardiovascular:  Negative for chest pain and palpitations.   Gastrointestinal:  Negative for abdominal pain, bowel incontinence, nausea and vomiting.   Endocrine: Negative for polydipsia, polyphagia and polyuria.   Genitourinary:  Negative for bladder incontinence, difficulty urinating, dysuria, hematuria, pelvic pain and testicular pain.   Musculoskeletal:  Positive for back pain. Negative for arthralgias.   Skin:  Positive for rash and wound. Negative for color change.   Allergic/Immunologic: Negative for immunocompromised state.   Neurological:  Negative for dizziness, tingling, seizures, syncope, weakness, numbness, headaches and paresthesias.   Hematological:  Negative for adenopathy.   Psychiatric/Behavioral:  Negative for confusion and dysphoric mood.    All other systems reviewed and are negative.      Physical Exam  Physical Exam  Vitals and nursing note reviewed.   Constitutional:       General: He is not in acute distress.     Appearance: Normal appearance. He is not ill-appearing, toxic-appearing or diaphoretic.   HENT:      Head: Normocephalic and atraumatic.      Nose: Nose normal. No congestion or rhinorrhea.      Mouth/Throat:      Mouth: Mucous membranes are moist.      Pharynx: Oropharynx is clear. No oropharyngeal exudate or posterior oropharyngeal erythema.   Eyes:      General:         Right eye: No discharge.         Left eye: No discharge.   Cardiovascular:      Rate and Rhythm: Normal rate and regular rhythm.      Pulses: Normal pulses.      Heart sounds: Normal heart sounds. No murmur heard.     No gallop.   Pulmonary:      Effort: Pulmonary effort is normal. No respiratory distress.      Breath sounds: Normal breath sounds. No stridor. No wheezing, rhonchi or rales.   Chest:      Chest wall: No tenderness.   Abdominal:      General: Bowel  sounds are normal. There is no distension.      Palpations: Abdomen is soft. There is no mass.      Tenderness: There is no abdominal tenderness. There is no right CVA tenderness, left CVA tenderness, guarding or rebound.      Hernia: No hernia is present.   Musculoskeletal:         General: Tenderness present. Normal range of motion.      Cervical back: Normal range of motion and neck supple.      Comments: Mild tenderness palpation of the right lower lumbar region, reproduction of pain with ambulation and with straight leg raise, no radiation past the knee.   Skin:     General: Skin is warm and dry.      Capillary Refill: Capillary refill takes less than 2 seconds.      Findings: Erythema present.      Comments: There is a softball sized area of erythema and a central blister to the right lower back   Neurological:      General: No focal deficit present.      Mental Status: He is alert and oriented to person, place, and time.      Cranial Nerves: No cranial nerve deficit.      Sensory: No sensory deficit.      Motor: No weakness.      Coordination: Coordination normal.      Gait: Gait normal.      Deep Tendon Reflexes: Reflexes normal.   Psychiatric:         Mood and Affect: Mood normal.         Behavior: Behavior normal.         Thought Content: Thought content normal.         Judgment: Judgment normal.         Vital Signs  ED Triage Vitals [07/22/24 1104]   Temperature Pulse Respirations Blood Pressure SpO2   98 °F (36.7 °C) 55 16 162/77 97 %      Temp Source Heart Rate Source Patient Position - Orthostatic VS BP Location FiO2 (%)   Temporal Monitor Sitting Right arm --      Pain Score       9           Vitals:    07/22/24 1104   BP: 162/77   Pulse: 55   Patient Position - Orthostatic VS: Sitting         Visual Acuity      ED Medications  Medications   ketorolac (TORADOL) injection 30 mg (30 mg Intramuscular Given 7/22/24 1129)       Diagnostic Studies  Results Reviewed       Procedure Component Value Units  Date/Time    Comprehensive metabolic panel [[id number]] Collected: 07/22/24 1128    Lab Status: Final result Specimen: Blood from Arm, Left Updated: 07/22/24 1150     Sodium 137 mmol/L      Potassium 4.0 mmol/L      Chloride 103 mmol/L      CO2 27 mmol/L      ANION GAP 7 mmol/L      BUN 17 mg/dL      Creatinine 0.98 mg/dL      Glucose 132 mg/dL      Calcium 9.8 mg/dL      AST 18 U/L      ALT 18 U/L      Alkaline Phosphatase 53 U/L      Total Protein 7.5 g/dL      Albumin 4.3 g/dL      Total Bilirubin 0.75 mg/dL      eGFR 78 ml/min/1.73sq m     Narrative:      National Kid[employer] guidelines for Chronic Kidney Disease (CKD):     Stage 1 with normal or high GFR (GFR > 90 mL/min/1.73 square meters)    Stage 2 Mild CKD (GFR = 60-89 mL/min/1.73 square meters)    Stage 3A Moderate CKD (GFR = 45-59 mL/min/1.73 square meters)    Stage 3B Moderate CKD (GFR = 30-44 mL/min/1.73 square meters)    Stage 4 Severe CKD (GFR = 15-29 mL/min/1.73 square meters)    Stage 5 End Stage CKD (GFR <15 mL/min/1.73 square meters)  Note: GFR calculation is accurate only with a steady state creatinine    UA w Reflex to Microscopic w Reflex to Culture [[id number]] Collected: 07/22/24 1128    Lab Status: Final result Specimen: Urine, Clean Catch Updated: 07/22/24 1147     Color, UA Yellow     Clarity, UA Clear     Specific Gravity, UA 1.025     pH, UA 5.5     Leukocytes, UA Negative     Nitrite, UA Negative     Protein, UA Negative mg/dl      Glucose, UA Negative mg/dl      Ketones, UA Negative mg/dl      Urobilinogen, UA 0.2 E.U./dl      Bilirubin, UA Negative     Occult Blood, UA Negative    CBC and differential [[id number]] Collected: 07/22/24 1128    Lab Status: Final result Specimen: Blood from Arm, Left Updated: 07/22/24 1136     WBC 7.77 Thousand/uL      RBC 5.15 Million/uL      Hemoglobin 15.9 g/dL      Hematocrit 47.2 %      MCV 92 fL      MCH 30.9 pg      MCHC 33.7 g/dL      RDW 13.2 %      MPV 9.3 fL      Platelets 223  Thousands/uL      nRBC 0 /100 WBCs      Segmented % 74 %      Immature Grans % 1 %      Lymphocytes % 14 %      Monocytes % 7 %      Eosinophils Relative 3 %      Basophils Relative 1 %      Absolute Neutrophils 5.81 Thousands/µL      Absolute Immature Grans 0.04 Thousand/uL      Absolute Lymphocytes 1.07 Thousands/µL      Absolute Monocytes 0.54 Thousand/µL      Eosinophils Absolute 0.24 Thousand/µL      Basophils Absolute 0.07 Thousands/µL                    CT spine lumbar without contrast   Final Result by Suki Skinner MD (07/22 1231)      No acute compression collapse of the vertebra. Degenerative anterolisthesis of L5 over S1 with facet joint disease with severe bilateral foraminal narrowing.      Degenerative disc disease at L4-5 with disc bulge, severe central canal narrowing, suggestion of a central left paracentral protrusion with the abutment of the traversing right L5 and impingement of the traversing left L5 nerve root      Moderate to severe central canal narrowing at L3-4 with degenerative disc disease, asymmetric right ligamentum flavum hypertrophy. There is associated right foraminal and extraforaminal protrusion with moderate right foraminal narrowing and no    significant left foraminal narrowing         Workstation performed: [id number]                    Procedures  Procedures         ED Course                                 SBIRT 22yo+      Flowsheet Row Most Recent Value   Initial Alcohol Screen: US AUDIT-C     1. How often do you have a drink containing alcohol? 0 Filed at: 07/22/2024 1107   2. How many drinks containing alcohol do you have on a typical day you are drinking?  0 Filed at: 07/22/2024 1107   3b. FEMALE Any Age, or MALE 65+: How often do you have 4 or more drinks on one occassion? 0 Filed at: 07/22/2024 1107   Audit-C Score 0 Filed at: 07/22/2024 1107   HELENE: How many times in the past year have you...    Used an illegal drug or used a prescription medication for non-medical  reasons? Never Filed at: 07/22/2024 1107                      Medical Decision Making  1106: Patient appears well, vital signs reviewed.  Patient with ongoing left lower lumbar pain.  History of sciatica, seems similar.  The patient presents as he was noted to have a wound to his right lower back when recently seen at urgent care 3 days prior to arrival.  With ongoing pain he wanted to make sure that he did not have any worsening disease or infection.  He was placed on antibiotics at the urgent care.  The wound is a healing burn versus abrasion wound there is an open blister centrally.  There is no gross evidence of cellulitis.  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  Tetanus up-to-date.  For reassurance, plan to complete basic labs, urinalysis and CT lumbar spine.    1230: CT and labs reviewed.  Pain well controlled.  Analgesics and medrol dose nova prescribed for LBP.  Plan to add bacitracin for wound care.    Amount and/or Complexity of Data Reviewed  Labs: ordered.  Radiology: ordered and independent interpretation performed.     Details: CT lumbar spine--DDD Lspine, no acute fracture    Risk  OTC drugs.  Prescription drug management.                 Disposition  Final diagnoses:   Low back pain   Burn     Time reflects when diagnosis was documented in both MDM as applicable and the Disposition within this note       Time User Action Codes Description Comment    7/22/2024 12:41 PM Semaj Eng Add [M54.50] Low back pain     7/22/2024 12:41 PM Semaj Eng Add [T30.0] Burn           ED Disposition       ED Disposition   Discharge    Condition   Stable    Date/Time   Mon Jul 22, 2024 1241    Comment   Kt Horowitz discharge to home/self care.                   Follow-up Information       Follow up With Specialties Details Why Contact Info    Cachorro Dash,  Family Medicine Schedule an appointment as soon as possible for a  visit   [address] [facility] [address] [phone]              Discharge Medication List as of 7/22/2024 12:42 PM        START taking these medications    Details   bacitracin topical ointment 500 units/g topical ointment Apply 1 large application topically 2 (two) times a day for 14 days, Starting Mon 7/22/2024, Until Mon 8/5/2024, Print      traMADol (Ultram) 50 mg tablet Take 1 tablet (50 mg total) by mouth every 8 (eight) hours as needed for moderate pain for up to 10 days, Starting Mon 7/22/2024, Until Thu 8/1/2024 at 2359, Print           CONTINUE these medications which have NOT CHANGED    Details   albuterol (Proventil HFA) 90 mcg/act inhaler Inhale 2 puffs every 4 (four) hours as needed for wheezing or shortness of breath, Starting Mon 1/30/2023, Normal      amLODIPine (NORVASC) 10 mg tablet 25 mg , Starting Sat 3/7/2020, Historical Med      aspirin 81 mg chewable tablet Chew 81 mg daily, Historical Med      !! atorvastatin (LIPITOR) 10 mg tablet Starting Fri 1/17/2020, Historical Med      !! atorvastatin (LIPITOR) 20 mg tablet Historical Med      cephalexin (KEFLEX) 500 mg capsule Take 1 capsule (500 mg total) by mouth every 8 (eight) hours for 7 days, Starting Fri 7/19/2024, Until Fri 7/26/2024, Normal      hydrochlorothiazide (HYDRODIURIL) 25 mg tablet Starting Sat 3/7/2020, Historical Med      losartan (COZAAR) 50 mg tablet Starting Fri 1/17/2020, Historical Med      primidone (MYSOLINE) 50 mg tablet TAKE 1 TABLET (50 MG) BY MOUTH 2 (TWO) TIMES A DAY., Historical Med      propranolol (INDERAL LA) 60 mg 24 hr capsule Starting Sat 3/7/2020, Historical Med      benzonatate (TESSALON PERLES) 100 mg capsule Take 1 capsule (100 mg total) by mouth 3 (three) times a day as needed for cough, Starting Thu 1/5/2023, Normal      meloxicam (MOBIC) 15 mg tablet Starting Sat 3/7/2020, Historical Med      methocarbamol (ROBAXIN) 750 mg tablet Take 1 tablet (750 mg total) by mouth every 6 (six) hours as  needed for muscle spasms for up to 5 days, Starting Wed 3/25/2020, Until Mon 3/30/2020, Normal      predniSONE 10 mg tablet Take 1 tablet (10 mg total) by mouth daily Take 6 on day 1, take 5 on day 2, take 4 on day 3, take 3 on day 4, take 2 on day 5, take 1 on day 6., Starting Mon 1/30/2023, Normal       !! - Potential duplicate medications found. Please discuss with provider.          No discharge procedures on file.    PDMP Review       None            ED Provider  Electronically Signed by             Semaj Eng MD  07/22/24 3405

## 2024-07-29 ENCOUNTER — HOSPITAL ENCOUNTER (EMERGENCY)
Facility: HOSPITAL | Age: 69
Discharge: HOME | End: 2024-07-29
Attending: STUDENT IN AN ORGANIZED HEALTH CARE EDUCATION/TRAINING PROGRAM
Payer: MEDICARE

## 2024-07-29 ENCOUNTER — OFFICE VISIT (OUTPATIENT)
Dept: PHYSICAL THERAPY | Facility: CLINIC | Age: 69
End: 2024-07-29
Payer: MEDICARE

## 2024-07-29 ENCOUNTER — TELEPHONE (OUTPATIENT)
Age: 69
End: 2024-07-29

## 2024-07-29 VITALS
OXYGEN SATURATION: 94 % | TEMPERATURE: 97.7 F | WEIGHT: 235 LBS | HEIGHT: 71 IN | SYSTOLIC BLOOD PRESSURE: 201 MMHG | DIASTOLIC BLOOD PRESSURE: 89 MMHG | BODY MASS INDEX: 32.9 KG/M2 | RESPIRATION RATE: 18 BRPM | HEART RATE: 49 BPM

## 2024-07-29 DIAGNOSIS — M54.50 LOW BACK PAIN: Primary | ICD-10-CM

## 2024-07-29 DIAGNOSIS — M54.51 VERTEBROGENIC LOW BACK PAIN: Primary | ICD-10-CM

## 2024-07-29 PROCEDURE — 99283 EMERGENCY DEPT VISIT LOW MDM: CPT

## 2024-07-29 PROCEDURE — 96372 THER/PROPH/DIAG INJ SC/IM: CPT

## 2024-07-29 PROCEDURE — 99284 EMERGENCY DEPT VISIT MOD MDM: CPT | Performed by: PHYSICIAN ASSISTANT

## 2024-07-29 PROCEDURE — 97162 PT EVAL MOD COMPLEX 30 MIN: CPT | Performed by: PHYSICAL THERAPIST

## 2024-07-29 RX ORDER — LIDOCAINE 50 MG/G
1 PATCH TOPICAL ONCE
Status: DISCONTINUED | OUTPATIENT
Start: 2024-07-29 | End: 2024-07-29 | Stop reason: HOSPADM

## 2024-07-29 RX ORDER — GABAPENTIN 300 MG/1
300 CAPSULE ORAL 3 TIMES DAILY
Qty: 21 CAPSULE | Refills: 0 | Status: SHIPPED | OUTPATIENT
Start: 2024-07-29 | End: 2024-08-05

## 2024-07-29 RX ORDER — OXYCODONE HYDROCHLORIDE AND ACETAMINOPHEN 5; 325 MG/1; MG/1
1 TABLET ORAL EVERY 4 HOURS PRN
Qty: 6 TABLET | Refills: 0 | Status: SHIPPED | OUTPATIENT
Start: 2024-07-29 | End: 2024-08-01

## 2024-07-29 RX ORDER — MORPHINE SULFATE 4 MG/ML
4 INJECTION, SOLUTION INTRAMUSCULAR; INTRAVENOUS ONCE
Status: COMPLETED | OUTPATIENT
Start: 2024-07-29 | End: 2024-07-29

## 2024-07-29 RX ORDER — GABAPENTIN 300 MG/1
300 CAPSULE ORAL ONCE
Status: COMPLETED | OUTPATIENT
Start: 2024-07-29 | End: 2024-07-29

## 2024-07-29 RX ADMIN — LIDOCAINE 1 PATCH: 50 PATCH CUTANEOUS at 16:13

## 2024-07-29 RX ADMIN — MORPHINE SULFATE 4 MG: 4 INJECTION INTRAVENOUS at 16:12

## 2024-07-29 RX ADMIN — GABAPENTIN 300 MG: 300 CAPSULE ORAL at 16:12

## 2024-07-29 NOTE — PROGRESS NOTES
PT Evaluation     Today's date: 2024  Patient name: Kt Horowitz  : 1955  MRN: [id number]  Referring provider: Vitaly Sawyer PT  Dx:   Encounter Diagnosis     ICD-10-CM    1. Vertebrogenic low back pain  M54.51                      Assessment  Symptom irritability: high    Assessment details: Pt presents to PT with highly irritable low back pain. Unable to sit or lay without extreme pain. Unable to do full assessment. Due to significant worsening over last 4 days, feel best to have re-assessed at emergency room. Did discuss with pain management and they are going to move up his appt from late August to potentially next week. Will further assess upon return. Pt in agreement with plan.   Understanding of Dx/Px/POC: good     Prognosis: good    Goals  STG:   Refer to ED/pain management for pain control as unable to fully assess due to high level of pain  Update goals upon further assessment    Plan  Patient would benefit from: skilled physical therapy  Planned modality interventions: cryotherapy and thermotherapy: hydrocollator packs    Planned therapy interventions: manual therapy, neuromuscular re-education, self care, therapeutic activities, therapeutic exercise, home exercise program and gait training    Frequency: 2x week  Duration in weeks: 12  Plan of Care beginning date: 2024  Plan of Care expiration date: 10/21/2024  Treatment plan discussed with: patient  Plan details: TE, NMR, TA, MT, self education, and modalities as needed in order to progress through skilled PT focused on  ROM, strength, balance, coordination             Subjective Evaluation    History of Present Illness  Mechanism of injury: 68 year old male presenting to PT with significant low back and leg pain. Started 2 weeks ago and progressively worsened. Originally saw urgent care who gave antibiotic for scratch and then went to ER a few days later b/c kept getting worse. Gave steroid pack and also antibiotic for blister.  Had CT scan/x-ray while there.  Felt a little better for next few days however since Thursday has progressively gotten worse. He is to point where he has sharp pain into R low back with sitting, standing, laying. Standing slightly flexed is most comfortable but still painful. Can lay a specific way on right side but has pain getting there and this is only position to sleep.    Denies bowel or bladder issues, denies saddle anesthesia. No hx of low back or hip surgeries but has had back pain in past but nothing like this.  Difficulty getting in and out of car, transfers, walking, standing.   Patient Goals  Patient goals for therapy: decreased pain, improved balance, increased motion, increased strength, independence with ADLs/IADLs and return to sport/leisure activities  Patient goal: return to active lifestyle including hunting and outdoor activity, normal transfers, walking  Pain  At worst pain rating: 10          Objective  Observation/Gait  Antalgic gait, shuffling gait pattern with major lean onto SPC    Left trunk shift shld relative to pelvis  --pain with correction    Multi-seg movement patterns  Held - unable        Strength/myotome  Knee ext: 4+/5  Knee flex: 4+/5 with pain into low back  Ankle df: 4/5  Ankle pf: 4+/5  Anlke inv/ev: 4/5 R, 5/5 L  Great toe 4/5 R, 5/5 L      DTR's  1+ S1 (B)                 Precautions: significant irritability lumbar pain with shift    Daily Treatment Diary:      Initial Evaluation Date: 07/29/24  POC Expiration: 10/21/24  Compliance 07/29/24                     Visit Number 1                    Re-Eval  IE                 MC   Foto Captured Y                          Manuals        Manual traction        Joint work        flexibility        ST        Neuro Re-Ed        TA        TA+march        birddog        bridge                                Ther Ex        NuStep/TM        clamshells        Hip ext        Leg press        Ham curls        Knee ext        Ankle strength  - band        Standing HR        Lumbar ext        Lumbar flex                SKTC        LTR        Prone ext                Ther Activity                        Gait Training                        Modalities

## 2024-07-29 NOTE — TELEPHONE ENCOUNTER
Caller: [email or web address]    Doctor:     Reason for call: called to schedule an appt. Offered 8/28 and decline    Call back#:

## 2024-07-29 NOTE — ED PROVIDER NOTES
History  Chief Complaint   Patient presents with    Back Pain     Seen last week for the same. Was prescribed a steroid and abx and has finished both and has been doing physical therapy. Pt states he has worsening rlq back pain and was referred here for stronger pain medication     68-year-old male presents to the emergency department for evaluation of back pain.  Patient states pain starts approximately 2 weeks ago after he had on prior to multiple baseball games.  States he was first seen at urgent care and had a heat blister which they were treating with antibiotics.  States he continue with the pain and the wound was worsening and therefore was seen in our emergency department on the 22nd.  States now wound is almost completely healed however the back pain has persisted.  States it did improve with the steroid however since the steroid has been completed pain has been worse.  States the muscle relaxer is not improving symptoms.  Patient states he was scheduled to follow-up with physical therapy however was unable to perform the physical therapy due to the significant pain today and was told by the physical therapist come to the emergency department for stronger pain medications.  Patient states he is also being scheduled to follow-up with Dr. Guan next week.  Patient denies any loss of bowel or bladder control.  Denies any rectal numbness or saddle paresthesias.  Denies any fevers or chills.  Denies any IV drug use.  Denies any new or recent traumas.  Denies any new injury from recent visit.  Denies any urinary symptoms.   Patient's last visit the 22nd he had a CT of lumbar spine.  Findings below.  No acute compression collapse of the vertebra. Degenerative anterolisthesis of L5 over S1 with facet joint disease with severe bilateral foraminal narrowing.   Degenerative disc disease at L4-5 with disc bulge, severe central canal narrowing, suggestion of a central left paracentral protrusion with the abutment  of the traversing right L5 and impingement of the traversing left L5 nerve root   Moderate to severe central canal narrowing at L3-4 with degenerative disc disease, asymmetric right ligamentum flavum hypertrophy. There is associated right foraminal and extraforaminal protrusion with moderate right foraminal narrowing and no   significant left foraminal narrowing          Prior to Admission Medications   Prescriptions Last Dose Informant Patient Reported? Taking?   albuterol (Proventil HFA) 90 mcg/act inhaler   No No   Sig: Inhale 2 puffs every 4 (four) hours as needed for wheezing or shortness of breath   amLODIPine (NORVASC) 10 mg tablet  Self Yes No   Si mg    aspirin 81 mg chewable tablet   Yes No   Sig: Chew 81 mg daily   atorvastatin (LIPITOR) 10 mg tablet   Yes No   Patient not taking: Reported on 2024   atorvastatin (LIPITOR) 20 mg tablet   Yes No   bacitracin topical ointment 500 units/g topical ointment   No No   Sig: Apply 1 large application topically 2 (two) times a day for 14 days   hydrochlorothiazide (HYDRODIURIL) 25 mg tablet   Yes No   losartan (COZAAR) 50 mg tablet   Yes No   methocarbamol (ROBAXIN) 500 mg tablet   No No   Sig: Take 1 tablet (500 mg total) by mouth 2 (two) times a day as needed for muscle spasms   methylPREDNISolone 4 MG tablet therapy pack   No No   Sig: Use as directed on package   primidone (MYSOLINE) 50 mg tablet   Yes No   Sig: TAKE 1 TABLET (50 MG) BY MOUTH 2 (TWO) TIMES A DAY.   propranolol (INDERAL LA) 60 mg 24 hr capsule   Yes No   traMADol (Ultram) 50 mg tablet   No No   Sig: Take 1 tablet (50 mg total) by mouth every 8 (eight) hours as needed for moderate pain for up to 10 days      Facility-Administered Medications: None       Past Medical History:   Diagnosis Date    Ankle injury     Arthritis     High cholesterol     Hypertension        Past Surgical History:   Procedure Laterality Date    ADENOIDECTOMY      CARPAL TUNNEL RELEASE Bilateral     JOINT  REPLACEMENT      MENISCECTOMY Bilateral     REPLACEMENT TOTAL KNEE Left     TONSILLECTOMY         Family History   Problem Relation Age of Onset    Heart disease Mother     Arthritis Mother     Heart disease Father      I have reviewed and agree with the history as documented.    E-Cigarette/Vaping    E-Cigarette Use Never User      E-Cigarette/Vaping Substances    Nicotine No     THC No     CBD No     Flavoring No     Other No     Unknown No      Social History     Tobacco Use    Smoking status: Never    Smokeless tobacco: Current     Types: Chew   Vaping Use    Vaping status: Never Used   Substance Use Topics    Alcohol use: Yes     Comment: occassionally    Drug use: Never       Review of Systems   Constitutional: Negative.    Respiratory: Negative.     Cardiovascular: Negative.    Musculoskeletal:  Positive for back pain.   Skin:  Positive for wound (healing).   All other systems reviewed and are negative.      Physical Exam  Physical Exam  Vitals and nursing note reviewed.   Constitutional:       General: He is not in acute distress.     Appearance: Normal appearance. He is not ill-appearing, toxic-appearing or diaphoretic.   HENT:      Head: Normocephalic.      Nose: Nose normal.   Eyes:      Conjunctiva/sclera: Conjunctivae normal.   Pulmonary:      Effort: Pulmonary effort is normal.   Musculoskeletal:      Cervical back: Normal.      Thoracic back: Normal.      Comments: Diffuse lower back tenderness to palpation with no specific midline tenderness or step-off deformities.   Skin:     General: Skin is warm and dry.      Findings: No bruising, erythema or rash.      Comments: Well healing wound in the right lower back, no streaking redness, warmth or tenderness to palpation.  No palpable areas of fluctuance or induration.   Neurological:      General: No focal deficit present.      Mental Status: He is alert and oriented to person, place, and time.      Deep Tendon Reflexes:      Reflex Scores:        Patellar reflexes are 2+ on the right side and 2+ on the left side.  Psychiatric:         Mood and Affect: Mood normal.         Vital Signs  ED Triage Vitals [07/29/24 1543]   Temperature Pulse Respirations Blood Pressure SpO2   97.7 °F (36.5 °C) (!) 54 18 (!) 201/89 97 %      Temp Source Heart Rate Source Patient Position - Orthostatic VS BP Location FiO2 (%)   Temporal Monitor Standing Left arm --      Pain Score       10 - Worst Possible Pain           Vitals:    07/29/24 1543 07/29/24 1545 07/29/24 1600   BP: (!) 201/89 (!) 201/89    Pulse: (!) 54 (!) 54 (!) 49   Patient Position - Orthostatic VS: Standing           Visual Acuity      ED Medications  Medications   lidocaine (LIDODERM) 5 % patch 1 patch (1 patch Topical Medication Applied 7/29/24 1613)   gabapentin (NEURONTIN) capsule 300 mg (300 mg Oral Given 7/29/24 1612)   morphine injection 4 mg (4 mg Intramuscular Given 7/29/24 1612)       Diagnostic Studies  Results Reviewed       None                   No orders to display              Procedures  Procedures         ED Course                                               Medical Decision Making  68-year-old male presents to the emergency department for evaluation of back pain.  Vitals and medical record reviewed.  Recent workup for the same.  Requesting stronger pain medication.  Patient did not want any further work up in the emergency department today.  Patient was later found to have degenerative disc disease central canal narrowing in the lumbar spine.  Will attempt treating with gabapentin.  Percocet also prescribed for breakthrough pain.  Patient has appropriate follow-up with physical therapy and reported follow-up with Dr. Guan.  Denies any red flag symptoms for cauda equina syndrome.  Patient was treated symptomatically in the emergency department.  All questions were answered.  He was clinically and hemodynamically stable for discharge    Problems Addressed:  Low back pain: acute illness or  injury    Risk  Prescription drug management.                 Disposition  Final diagnoses:   Low back pain     Time reflects when diagnosis was documented in both MDM as applicable and the Disposition within this note       Time User Action Codes Description Comment    7/29/2024  3:58 PM Angélica Gonzalez Add [M54.50] Low back pain           ED Disposition       ED Disposition   Discharge    Condition   Stable    Date/Time   Mon Jul 29, 2024  3:57 PM    Comment   Kt Horowitz discharge to home/self care.                   Follow-up Information       Follow up With Specialties Details Why Contact Info    Cachorro Dash DO Family Medicine   [address] [facility] [address] [phone]      Shai Guan MD Pain Medicine, Interventional Radiology   [address] [address]  [phone]              Discharge Medication List as of 7/29/2024  4:05 PM        START taking these medications    Details   gabapentin (Neurontin) 300 mg capsule Take 1 capsule (300 mg total) by mouth 3 (three) times a day for 7 days For post-herpetic neuralgia: Take 1 tablet on day 1,  Then take 2 tablets on day 2, Then take 3 tablets on day 3 and every day after that as instructed by your doctor., Starting Mon  7/29/2024, Until Mon 8/5/2024, Normal      oxyCODONE-acetaminophen (Percocet) 5-325 mg per tablet Take 1 tablet by mouth every 4 (four) hours as needed for severe pain for up to 3 days Max Daily Amount: 6 tablets, Starting Mon 7/29/2024, Until Thu 8/1/2024 at 2359, Normal           CONTINUE these medications which have NOT CHANGED    Details   albuterol (Proventil HFA) 90 mcg/act inhaler Inhale 2 puffs every 4 (four) hours as needed for wheezing or shortness of breath, Starting Mon 1/30/2023, Normal      amLODIPine (NORVASC) 10 mg tablet 25 mg , Starting Sat 3/7/2020, Historical Med      aspirin 81 mg chewable tablet Chew 81 mg daily, Historical Med      !! atorvastatin (LIPITOR) 10 mg tablet  Starting Fri 1/17/2020, Historical Med      !! atorvastatin (LIPITOR) 20 mg tablet Historical Med      bacitracin topical ointment 500 units/g topical ointment Apply 1 large application topically 2 (two) times a day for 14 days, Starting Mon 7/22/2024, Until Mon 8/5/2024, Print      hydrochlorothiazide (HYDRODIURIL) 25 mg tablet Starting Sat 3/7/2020, Historical Med      losartan (COZAAR) 50 mg tablet Starting Fri 1/17/2020, Historical Med      methocarbamol (ROBAXIN) 500 mg tablet Take 1 tablet (500 mg total) by mouth 2 (two) times a day as needed for muscle spasms, Starting Mon 7/22/2024, Normal      methylPREDNISolone 4 MG tablet therapy pack Use as directed on package, Normal      primidone (MYSOLINE) 50 mg tablet TAKE 1 TABLET (50 MG) BY MOUTH 2 (TWO) TIMES A DAY., Historical Med      propranolol (INDERAL LA) 60 mg 24 hr capsule Starting Sat 3/7/2020, Historical Med      traMADol (Ultram) 50 mg tablet Take 1 tablet (50 mg total) by mouth every 8 (eight) hours as needed for moderate pain for up to 10 days, Starting Mon 7/22/2024, Until Thu 8/1/2024 at 2359, Print       !! - Potential duplicate medications found. Please discuss with provider.          No discharge procedures on file.    PDMP Review         Value Time User    PDMP Reviewed  Yes 7/29/2024  4:02 PM Angélica Gonzalez PA-C            ED Provider  Electronically Signed by             Angélica Gonzalez PA-C  07/29/24 9664

## 2024-07-29 NOTE — TELEPHONE ENCOUNTER
Caller: ishaan    Doctor: rinku    Reason for call: pt would like an appt for next week.    Call back#: [phone]

## 2024-07-29 NOTE — DISCHARGE INSTRUCTIONS
Please follow-up with Dr. Guan.  Additionally continue with physical therapy.  Take medications as prescribed.  Return with new or worsening symptoms  Please be aware that the Percocet which was sent to your pharmacy is a narcotic medication.  This is a medication that you should not use if you are operating any heavy machinery as it may make you drowsy it is also addicting medication and can be constipating.  Please use with great caution.

## 2024-07-31 ENCOUNTER — OFFICE VISIT (OUTPATIENT)
Dept: PHYSICAL THERAPY | Facility: CLINIC | Age: 69
End: 2024-07-31
Payer: MEDICARE

## 2024-07-31 DIAGNOSIS — M54.51 VERTEBROGENIC LOW BACK PAIN: Primary | ICD-10-CM

## 2024-07-31 PROCEDURE — 97140 MANUAL THERAPY 1/> REGIONS: CPT | Performed by: PHYSICAL THERAPIST

## 2024-07-31 PROCEDURE — 97110 THERAPEUTIC EXERCISES: CPT | Performed by: PHYSICAL THERAPIST

## 2024-07-31 NOTE — PROGRESS NOTES
"Daily Note     Today's date: 2024  Patient name: Kt Horowitz  : 1955  MRN: [id number]  Referring provider: Vitaly Sawyer, PT  Dx:   Encounter Diagnosis     ICD-10-CM    1. Vertebrogenic low back pain  M54.51                      Subjective: ER gave gabapentin and pain meds. Still having pain but not as bad. More mid lumbar spine. Pain meds help a while but by middle of night wakes up with sharp pain. Seeing pain management tmrw AM      Objective: able to sit and lay supine, difficulty with transfers, SLR: WFL - some pain with hip assessment/SLR with active guarding, hip mobility wfl flex/abd  Ankle strength 5/5 throughout today (B)      Assessment: discussed log roll. Improved tolerance for sitting but discomfort remains. Shift remains - with repetition to correct, has pain. Able to work into minimal range of ext but no pain. Focused more mobility work and educated on continued activity/walking as tolerable. Held manual work due to irritability and guarding yet. Tolerated treatment fair. [place] better at end of session. Patient would benefit from continued PT      Plan: Continue per plan of care.      Precautions: significant irritability lumbar pain with shift    Daily Treatment Diary:      Initial Evaluation Date: 24  POC Expiration: 10/21/24  Compliance 24                   Visit Number 1 2                   Re-Eval  IE                 MC   Foto Captured Y                          Manuals       Manual traction        Joint work        flexibility        ST          assessment      Neuro Re-Ed        TA        TA+march        birddog        bridge                                Ther Ex        NuStep/TM        clamshells        Hip ext        Leg press        Ham curls        Knee ext        Ankle strength - band        Standing HR        Lumbar ext - standing  2x8      Lumbar flex        Shift correction  L shld into wall - d/c after 6 reps      SKTC  W/ towel 6x10\" ea   "    LTR  X8 ea      Prone ext        education  Log roll, discussed importance of staying active, walking as tolerable      Ther Activity                        Gait Training                        Modalities

## 2024-08-05 ENCOUNTER — OFFICE VISIT (OUTPATIENT)
Dept: PHYSICAL THERAPY | Facility: CLINIC | Age: 69
End: 2024-08-05
Payer: MEDICARE

## 2024-08-05 DIAGNOSIS — M54.51 VERTEBROGENIC LOW BACK PAIN: Primary | ICD-10-CM

## 2024-08-05 PROCEDURE — 97140 MANUAL THERAPY 1/> REGIONS: CPT | Performed by: PHYSICAL THERAPIST

## 2024-08-05 PROCEDURE — 97110 THERAPEUTIC EXERCISES: CPT | Performed by: PHYSICAL THERAPIST

## 2024-08-05 NOTE — PROGRESS NOTES
"Daily Note     Today's date: 2024  Patient name: Kt Horowitz  : 1955  MRN: [id number]  Referring provider: Cachorro Dash DO  Dx:   Encounter Diagnosis     ICD-10-CM    1. Vertebrogenic low back pain  M54.51                      Subjective: during day when moving isn't too bad, struggling to sleep b/c gets sharp pain R low back. Getting MRI tmrw      Objective: See treatment diary below      Assessment: trialed laying with pillow prop but pain remained. Tolerated supine flexion work. Added manual traction and tolerated well. At end of session had minimal pain with walking. Patient would benefit from continued PT      Plan: Continue per plan of care.      Precautions: significant irritability lumbar pain with shift    Daily Treatment Diary:      Initial Evaluation Date: 24  POC Expiration: 10/21/24  Compliance 24                 Visit Number 1 2  3                 Re-Eval  IE                 MC   Foto Captured Y                          Manuals      Manual traction   10'      Joint work        flexibility        ST          assessment      Neuro Re-Ed        TA        TA+march        birddog        bridge                                Ther Ex        NuStep/TM        clamshells        Hip ext   Plinth x20 ea     Leg press        Ham curls        Knee ext        Ankle strength - band        squats   x15             Standing HR        Lumbar ext - standing  2x8      Lumbar flex        Shift correction  L shld into wall - d/c after 6 reps      SKTC  W/ towel 6x10\" ea Towel 8x10\" ea     LTR  X8 ea x10     Prone ext        education  Log roll, discussed importance of staying active, walking as tolerable      Ther Activity                        Gait Training                        Modalities                                   "

## 2024-08-08 ENCOUNTER — OFFICE VISIT (OUTPATIENT)
Dept: PHYSICAL THERAPY | Facility: CLINIC | Age: 69
End: 2024-08-08
Payer: MEDICARE

## 2024-08-08 DIAGNOSIS — M54.51 VERTEBROGENIC LOW BACK PAIN: Primary | ICD-10-CM

## 2024-08-08 PROCEDURE — 97140 MANUAL THERAPY 1/> REGIONS: CPT | Performed by: PHYSICAL THERAPIST

## 2024-08-08 PROCEDURE — 97110 THERAPEUTIC EXERCISES: CPT | Performed by: PHYSICAL THERAPIST

## 2024-08-08 NOTE — PROGRESS NOTES
"Daily Note     Today's date: 2024  Patient name: Kt Horowitz  : 1955  MRN: [id number]  Referring provider: Cachorro Dash DO  Dx:   Encounter Diagnosis     ICD-10-CM    1. Vertebrogenic low back pain  M54.51                      Subjective: hasn't had to take pain meds for 3 days. Getting up 2-3x/night but pain isn't as bad and once moves around a little it improves.       Objective: See treatment diary below      Assessment: notable improvement with shift. Minimal remains. Still gets pain worsening with correction. Unable to tolerate traction today so held. Continued emphasis on movement with education on body awareness and pain response. He has good understanding. Tolerated treatment fair. Patient would benefit from continued PT      Plan: Continue per plan of care.      Precautions: significant irritability lumbar pain with shift    Daily Treatment Diary:      Initial Evaluation Date: 24  POC Expiration: 10/21/24  Compliance 24  8  8               Visit Number 1 2  3  4               Re-Eval  IE                 MC   Foto Captured Y                          Manuals  8    Manual traction   10'  Trialed but held today due to pain    Joint work        flexibility        ST          assessment  Trialed shift correction - d/c     Neuro Re-Ed        TA        TA+        bridge                                Ther Ex        NuStep/TM        clamshells        Hip ext   Plinth x20 ea Plinth 20 ea    Leg press        Ham curls        Knee ext        Ankle strength - band        squats   x15 x15            Standing HR        Lumbar ext - standing  2x8      Lumbar flex        Shift correction  L shld into wall - d/c after 6 reps      SKTC  W/ towel 6x10\" ea Towel 8x10\" ea Towel 8x10\"    LTR  X8 ea x10 Bkfo x10 ea, ltr x10 ea    Prone ext        education  Log roll, discussed importance of staying active, walking as tolerable      Ther Activity             "            Gait Training                        Modalities

## 2024-08-12 ENCOUNTER — OFFICE VISIT (OUTPATIENT)
Dept: PHYSICAL THERAPY | Facility: CLINIC | Age: 69
End: 2024-08-12
Payer: MEDICARE

## 2024-08-12 DIAGNOSIS — M54.51 VERTEBROGENIC LOW BACK PAIN: Primary | ICD-10-CM

## 2024-08-12 PROCEDURE — 97110 THERAPEUTIC EXERCISES: CPT | Performed by: PHYSICAL THERAPIST

## 2024-08-12 PROCEDURE — 97140 MANUAL THERAPY 1/> REGIONS: CPT | Performed by: PHYSICAL THERAPIST

## 2024-08-12 NOTE — PROGRESS NOTES
"Daily Note     Today's date: 2024  Patient name: Kt Horowitz  : 1955  MRN: [id number]  Referring provider: Cachorro Dash DO  Dx:   Encounter Diagnosis     ICD-10-CM    1. Vertebrogenic low back pain  M54.51                      Subjective: had a good weekend but today bent down in shower and had pop in back - pain back into back      Objective: See treatment diary below      Assessment: shift is minimal yet - did not return to prior level. Stiff to start session but after TE and MT had minimal pain with walking. Has follow-up with pain management to review MRI tmrw. Tolerated treatment fair. Patient would benefit from continued PT      Plan: Continue per plan of care.      Precautions: significant irritability lumbar pain with shift    Daily Treatment Diary:      Initial Evaluation Date: 24  POC Expiration: 10/21/24  Compliance 24  8             Visit Number 1 2  3  4  5             Re-Eval  IE                 MC   Foto Captured Y                          Manuals    Manual traction   10'  Trialed but held today due to pain 12'   Joint work        flexibility        ST          assessment  Trialed shift correction - d/c     Neuro Re-Ed        TA        TA+march        birddo        bridge                                Ther Ex        NuStep/TM        clamshells        Hip ext   Plinth x20 ea Plinth 20 ea nv   Leg press        Ham curls        Knee ext        Ankle strength - band        squats   x15 x15            Standing HR        Lumbar ext - standing  2x8      Lumbar flex        Shift correction  L shld into wall - d/c after 6 reps      SKTC  W/ towel 6x10\" ea Towel 8x10\" ea Towel 8x10\" 8x10\" ea   LTR  X8 ea x10 Bkfo x10 ea, ltr x10 ea LTR x10 ea   Prone ext     Standing ext x10   bridges     x15   Supine ball press     20x4\"   education  Log roll, discussed importance of staying active, walking as tolerable      Ther Activity                 "        Gait Training                        Modalities

## 2024-08-14 ENCOUNTER — OFFICE VISIT (OUTPATIENT)
Dept: PHYSICAL THERAPY | Facility: CLINIC | Age: 69
End: 2024-08-14
Payer: MEDICARE

## 2024-08-14 DIAGNOSIS — M54.51 VERTEBROGENIC LOW BACK PAIN: Primary | ICD-10-CM

## 2024-08-14 PROCEDURE — 97140 MANUAL THERAPY 1/> REGIONS: CPT | Performed by: PHYSICAL THERAPIST

## 2024-08-14 PROCEDURE — 97110 THERAPEUTIC EXERCISES: CPT | Performed by: PHYSICAL THERAPIST

## 2024-08-14 NOTE — PROGRESS NOTES
"Daily Note     Today's date: 2024  Patient name: Kt Horowitz  : 1955  MRN: [id number]  Referring provider: Cachorro Dash DO  Dx:   Encounter Diagnosis     ICD-10-CM    1. Vertebrogenic low back pain  M54.51                      Subjective: getting injection . Specialist advised to continue with PT if it is helping. He had best night sleep since pain started last night, only up 2x.       Objective: See treatment diary below      Assessment: overall movement improving. Some discomfort with bridges today but other activity tolerated well. Tolerated treatment well. Patient would benefit from continued PT      Plan: Continue per plan of care.      Precautions: significant irritability lumbar pain with shift    Daily Treatment Diary:      Initial Evaluation Date: 24  POC Expiration: 10/21/24  Compliance 24           Visit Number 1 2  3  4  5  6           Re-Eval  IE                 MC   Foto Captured Y                          Manuals    Manual traction 12'   Trialed but held today due to pain 12'   Joint work        flexibility        ST            Trialed shift correction - d/c     Neuro Re-Ed        TA        TA+        bridge                                Ther Ex        NuStep/TM nv       clamshells        Hip ext Plinth 2x10 ea   Plinth 20 ea nv   Leg press        Ham curls        Knee ext        Ankle strength - band        squats    x15            Standing HR        Lumbar ext - standing        Lumbar flex        Shift correction        SKTC Towel 8x20\" ea   Towel 8x10\" 8x10\" ea   LTR X12 ea   Bkfo x10 ea, ltr x10 ea LTR x10 ea   Prone ext     Standing ext x10   bridges x15    x15   Supine ball press 20x5\"    20x4\"   MARRY Gtb x20       education        Ther Activity                        Gait Training                        Modalities                                         "

## 2024-08-15 ENCOUNTER — APPOINTMENT (OUTPATIENT)
Dept: PHYSICAL THERAPY | Facility: CLINIC | Age: 69
End: 2024-08-15
Payer: MEDICARE

## 2024-08-20 ENCOUNTER — OFFICE VISIT (OUTPATIENT)
Dept: PHYSICAL THERAPY | Facility: CLINIC | Age: 69
End: 2024-08-20
Payer: MEDICARE

## 2024-08-20 DIAGNOSIS — M54.51 VERTEBROGENIC LOW BACK PAIN: Primary | ICD-10-CM

## 2024-08-20 PROCEDURE — 97140 MANUAL THERAPY 1/> REGIONS: CPT | Performed by: PHYSICAL THERAPIST

## 2024-08-20 PROCEDURE — 97110 THERAPEUTIC EXERCISES: CPT | Performed by: PHYSICAL THERAPIST

## 2024-08-20 NOTE — PROGRESS NOTES
"Daily Note     Today's date: 2024  Patient name: Kt Horowitz  : 1955  MRN: [id number]  Referring provider: Cachorro Dash DO  Dx:   Encounter Diagnosis     ICD-10-CM    1. Vertebrogenic low back pain  M54.51                      Subjective: able to go on beach trip and do everything except had pain with clamming. Stopped after 10 clams. Gets some tingling into R LE with sitting for 3-4 hours      Objective: See treatment diary below      Assessment: pt doing well overall. Increasing activity and tolerating well. Progressed trunk strength.     Plan: Continue per plan of care.      Precautions: significant irritability lumbar pain with shift    Daily Treatment Diary:      Initial Evaluation Date: 24  POC Expiration: 10/21/24  Compliance 24         Visit Number 1 2  3  4  5  6  7         Re-Eval  IE                 MC   Foto Captured Y                          Manuals    Manual traction 12' 10'  Trialed but held today due to pain 12'   Joint work        flexibility        ST            Trialed shift correction - d/c     Neuro Re-Ed        TA        TA+        bridge                                Ther Ex        NuStep/TM nv       clamshells        Hip ext Plinth 2x10 ea NV  Plinth 20 ea nv   Leg press        Ham curls        Knee ext        Ankle strength - band        squats    x15            Standing HR        Lumbar ext - standing        Lumbar flex        Shift correction        SKTC Towel 8x20\" ea 8x20\"  Towel 8x10\" 8x10\" ea   LTR X12 ea x12  Bkfo x10 ea, ltr x10 ea LTR x10 ea   Prone ext     Standing ext x10   bridges x15 2x12   x15   Supine ball press 20x5\" 20x5\"   20x4\"   MARRY Gtb x20 Btb x20      Paloff press  Btb 2x10      CC rows  40# 2x10      education        Ther Activity                        Gait Training                        Modalities                                           "

## 2024-08-26 ENCOUNTER — OFFICE VISIT (OUTPATIENT)
Dept: PHYSICAL THERAPY | Facility: CLINIC | Age: 69
End: 2024-08-26
Payer: MEDICARE

## 2024-08-26 DIAGNOSIS — M54.51 VERTEBROGENIC LOW BACK PAIN: Primary | ICD-10-CM

## 2024-08-26 PROCEDURE — 97140 MANUAL THERAPY 1/> REGIONS: CPT | Performed by: PHYSICAL THERAPIST

## 2024-08-26 PROCEDURE — 97110 THERAPEUTIC EXERCISES: CPT | Performed by: PHYSICAL THERAPIST

## 2024-08-26 NOTE — PROGRESS NOTES
"Daily Note     Today's date: 2024  Patient name: Kt Horowitz  : 1955  MRN: [id number]  Referring provider: Cachorro Dash DO  Dx:   Encounter Diagnosis     ICD-10-CM    1. Vertebrogenic low back pain  M54.51                      Subjective: injection went pretty well. Low back pain is minimal to none. Gets right sided upper buttock pain in AM and if does a lot but rates as 1-2. Still gets a little numbness in leg when sits for a while      Objective: See treatment diary below      Assessment: good strength distally and similar passive SLR. Doing well with current POC. Tolerated treatment well. Patient would benefit from continued PT with progression of HEP.       Plan: potential transition to HEP over next 1-2 visits     Precautions: significant irritability lumbar pain with shift    Daily Treatment Diary:      Initial Evaluation Date: 24  POC Expiration: 10/21/24  Compliance 24       Visit Number 1 2  3  4  5  6  7  8       Re-Eval  IE                 MC   Foto Captured Y                          Manuals    Manual traction 12' 10' 10' Trialed but held today due to pain 12'   Joint work   R LAD     flexibility        ST            Trialed shift correction - d/c     Neuro Re-Ed        TA        TA+        bridge                                Ther Ex        NuStep/TM nv  NS 8' L3     clamshells        Hip ext Plinth 2x10 ea NV  Plinth 20 ea nv   Leg press        Ham curls        Knee ext        Ankle strength - band        squats    x15            Standing HR        Lumbar ext - standing        Lumbar flex        Shift correction        SKTC Towel 8x20\" ea 8x20\" hep Towel 8x10\" 8x10\" ea   LTR X12 ea x12 hep Bkfo x10 ea, ltr x10 ea LTR x10 ea   Prone ext     Standing ext x10   bridges x15 2x12 2x15  x15   Supine ball press 20x5\" 20x5\" 20x5\"  20x4\"   MARRY Gtb x20 Btb x20 Herbert tb x20     Paloff press  Btb 2x10 " Herbert tb 2x10     CC rows  40# 2x10 40# 2x10     education        Ther Activity                        Gait Training                        Modalities

## 2024-08-29 ENCOUNTER — EVALUATION (OUTPATIENT)
Dept: PHYSICAL THERAPY | Facility: CLINIC | Age: 69
End: 2024-08-29
Payer: MEDICARE

## 2024-08-29 DIAGNOSIS — M54.51 VERTEBROGENIC LOW BACK PAIN: Primary | ICD-10-CM

## 2024-08-29 PROCEDURE — 97110 THERAPEUTIC EXERCISES: CPT | Performed by: PHYSICAL THERAPIST

## 2024-08-29 NOTE — PROGRESS NOTES
PT Discharge    Today's date: 2024  Patient name: Kt Horowitz  : 1955  MRN: [id number]  Referring provider: Cachorro Dash DO  Dx:   Encounter Diagnosis     ICD-10-CM    1. Vertebrogenic low back pain  M54.51                        Assessment  Symptom irritability: low    Assessment details: Pt has done well with therapy and injection. He is back to his normal activity. Good movement patterns and pleased with progress. He is appropriate for discharge. Spent time discussing importance of continuing with strength, reviewing lifting techniques. Discharged.  Understanding of Dx/Px/POC: good     Prognosis: good    Goals  STG: - MET  Refer to ED/pain management for pain control as unable to fully assess due to high level of pain  Update goals upon further assessment    Understands proper lifting techniques  Pain free multi-seg patterns  Return to active lifestyle    Plan    Planned therapy interventions: home exercise program    Duration in weeks: 12  Plan of Care beginning date: 2024  Plan of Care expiration date: 10/21/2024  Treatment plan discussed with: patient  Plan details: discharged            Subjective Evaluation    History of Present Illness  Mechanism of injury: Doing well. Able to drive a few hours and only had to get out once b/c started feeling a little discomfort. Once walked around it was fine. Has been doing a lot of yard work and no pain. Pleased with progress and ready for discharge.   Patient Goals  Patient goals for therapy: decreased pain, improved balance, increased motion, increased strength, independence with ADLs/IADLs and return to sport/leisure activities  Patient goal: return to active lifestyle including hunting and outdoor activity, normal transfers, walking  Pain  Current pain ratin  At worst pain rating: 3          Objective  Observation/Gait  Minimal shift    Multi-seg movement patterns  No pain with all motions        Strength/myotome  Knee ext: 5/5  Knee flex:  "5/5  Ankle df: 5/5  Ankle inv/ev: 4+/5 R, 5/5 L  Great toe 5/5 R, 5/5 L          Initial Evaluation Date: 07/29/24  POC Expiration: 10/21/24  Compliance 07/29/24 7/31  8/5 8/8 8/12 8/14 8/20 8/26       Visit Number 1 2  3  4  5  6  7  8       Re-Eval  IE                  MC   Foto Captured Y                              Manuals 8/14 8/20 8/26 8/29    Manual traction 12' 10' 10'     Joint work     R LAD     flexibility           ST                   assessment    Neuro Re-Ed           TA           TA+march           birddog           bridge                                               Ther Ex           NuStep/TM nv   NS 8' L3 NS 8' L3-4    clamshells           Hip ext Plinth 2x10 ea NV       Leg press           Ham curls           Knee ext           Ankle strength - band           squats                       Standing HR           Lumbar ext - standing           Lumbar flex           Shift correction           SKTC Towel 8x20\" ea 8x20\" hep     LTR X12 ea x12 hep     Prone ext           bridges x15 2x12 2x15 2x15    Supine ball press 20x5\" 20x5\" 20x5\" 20x5\"    MARRY Gtb x20 Btb x20 Herbert tb x20 Herbert tb x20    Paloff press   Btb 2x10 Herbert tb 2x10 Herbert tb 2x10    CC rows   40# 2x10 40# 2x10 40# 2x10    education       Reviewed importance of continuing with strength, gym program options -- reviewed lifting technique with proper lumbar position    Ther Activity                                   Gait Training                                   Modalities                                                  "

## 2024-12-18 ENCOUNTER — OFFICE VISIT (OUTPATIENT)
Dept: URGENT CARE | Facility: CLINIC | Age: 69
End: 2024-12-18
Payer: MEDICARE

## 2024-12-18 VITALS
BODY MASS INDEX: 32.51 KG/M2 | TEMPERATURE: 97.1 F | SYSTOLIC BLOOD PRESSURE: 140 MMHG | RESPIRATION RATE: 18 BRPM | OXYGEN SATURATION: 98 % | DIASTOLIC BLOOD PRESSURE: 71 MMHG | HEART RATE: 51 BPM | WEIGHT: 232.2 LBS | HEIGHT: 71 IN

## 2024-12-18 DIAGNOSIS — R09.82 POST-NASAL DRIP: ICD-10-CM

## 2024-12-18 DIAGNOSIS — J06.9 ACUTE URI: Primary | ICD-10-CM

## 2024-12-18 PROCEDURE — G0463 HOSPITAL OUTPT CLINIC VISIT: HCPCS | Performed by: PHYSICIAN ASSISTANT

## 2024-12-18 PROCEDURE — 99213 OFFICE O/P EST LOW 20 MIN: CPT | Performed by: PHYSICIAN ASSISTANT

## 2024-12-18 RX ORDER — AZITHROMYCIN 250 MG/1
TABLET, FILM COATED ORAL
Qty: 6 TABLET | Refills: 0 | Status: SHIPPED | OUTPATIENT
Start: 2024-12-18 | End: 2024-12-22

## 2024-12-18 RX ORDER — METHYLPREDNISOLONE 4 MG/1
TABLET ORAL
Qty: 1 EACH | Refills: 0 | Status: SHIPPED | OUTPATIENT
Start: 2024-12-18

## 2024-12-18 NOTE — PROGRESS NOTES
[facility]        NAME: Kt Horowitz is a 69 y.o. male  : 1955    MRN: [id number]  DATE: 2024  TIME: 10:02 AM    Assessment and Plan   Acute URI [J06.9]  1. Acute URI  methylPREDNISolone 4 MG tablet therapy pack    azithromycin (ZITHROMAX) 250 mg tablet      2. Post-nasal drip  methylPREDNISolone 4 MG tablet therapy pack    azithromycin (ZITHROMAX) 250 mg tablet            Patient Instructions   Patient was educated on URI and PND. Patient was educated on steroids and antibiotics. Patient was told to eat on antibiotics. Patient was told to not take OTC anti-inflammatories while on steroids. Any chest pain or shortness of breath go to ED. If cough persist recommend chest xray.     Follow up with PCP in 3-5 days.  Proceed to  ER if symptoms worsen.    If tests have been performed at [facility], our office will contact you with results if changes need to be made to the care plan discussed with you at the visit.  [facility]    Chief Complaint     Chief Complaint   Patient presents with    Cold Like Symptoms     Cough and chest congestion with wheezing x 2.5 weeks          History of Present Illness       Patient is a pleasant 69 year old male who presents today with wheezing, PND and Cough for 2-3 weeks. Admits trying mucinex with no relief. Denies any history of asthma or diabetes. Denies any history of smoking. Denies any allergies to medications        Review of Systems   Review of Systems   Constitutional: Negative.    HENT:  Positive for congestion and postnasal drip.    Respiratory:  Positive for cough.    Cardiovascular: Negative.    Neurological:  Negative for headaches.   Psychiatric/Behavioral: Negative.           Current Medications       Current Outpatient Medications:     amLODIPine (NORVASC) 10 mg tablet, 25 mg , Disp: , Rfl:     aspirin 81 mg chewable tablet, Chew 81 mg daily, Disp: , Rfl:     atorvastatin (LIPITOR) 20 mg  tablet, , Disp: , Rfl:     azithromycin (ZITHROMAX) 250 mg tablet, Take 2 tablets today then 1 tablet daily x 4 days, Disp: 6 tablet, Rfl: 0    gabapentin (Neurontin) 300 mg capsule, Take 1 capsule (300 mg total) by mouth 3 (three) times a day for 7 days For post-herpetic neuralgia: Take 1 tablet on day 1,  Then take 2 tablets on day 2, Then take 3 tablets on day 3 and every day after that as instructed by your doctor., Disp: 21 capsule, Rfl: 0    hydrochlorothiazide (HYDRODIURIL) 25 mg tablet, , Disp: , Rfl:     losartan (COZAAR) 50 mg tablet, , Disp: , Rfl:     methylPREDNISolone 4 MG tablet therapy pack, Use as directed on package, Disp: 1 each, Rfl: 0    propranolol (INDERAL LA) 60 mg 24 hr capsule, , Disp: , Rfl:     albuterol (Proventil HFA) 90 mcg/act inhaler, Inhale 2 puffs every 4 (four) hours as needed for wheezing or shortness of breath (Patient not taking: Reported on 12/18/2024), Disp: 18 g, Rfl: 0    atorvastatin (LIPITOR) 10 mg tablet, , Disp: , Rfl:     bacitracin topical ointment 500 units/g topical ointment, Apply 1 large application topically 2 (two) times a day for 14 days (Patient not taking: Reported on 12/18/2024), Disp: 28 g, Rfl: 0    methocarbamol (ROBAXIN) 500 mg tablet, Take 1 tablet (500 mg total) by mouth 2 (two) times a day as needed for muscle spasms (Patient not taking: Reported on 12/18/2024), Disp: 20 tablet, Rfl: 0    methylPREDNISolone 4 MG tablet therapy pack, Use as directed on package (Patient not taking: Reported on 12/18/2024), Disp: 21 tablet, Rfl: 0    primidone (MYSOLINE) 50 mg tablet, TAKE 1 TABLET (50 MG) BY MOUTH 2 (TWO) TIMES A DAY. (Patient not taking: Reported on 12/18/2024), Disp: , Rfl:     Current Allergies     Allergies as of 12/18/2024 - Reviewed 12/18/2024   Allergen Reaction Noted    Adhesive [medical tape] Rash 03/12/2020    Other Rash 03/16/2015            The following portions of the patient's history were reviewed and updated as appropriate: allergies,  "current medications, past family history, past medical history, past social history, past surgical history and problem list.     Past Medical History:   Diagnosis Date    Ankle injury     Arthritis     High cholesterol     Hypertension        Past Surgical History:   Procedure Laterality Date    ADENOIDECTOMY      CARPAL TUNNEL RELEASE Bilateral     JOINT REPLACEMENT      MENISCECTOMY Bilateral     REPLACEMENT TOTAL KNEE Left     TONSILLECTOMY         Family History   Problem Relation Age of Onset    Heart disease Mother     Arthritis Mother     Heart disease Father          Medications have been verified.        Objective   /71   Pulse (!) 51   Temp (!) 97.1 °F (36.2 °C) (Temporal)   Resp 18   Ht 5' 11\" (1.803 m)   Wt 105 kg (232 lb 3.2 oz)   SpO2 98%   BMI 32.39 kg/m²   No LMP for male patient.       Physical Exam     Physical Exam  Vitals and nursing note reviewed.   Constitutional:       Appearance: Normal appearance.   HENT:      Head: Normocephalic.      Comments: No pain over frontal or maxillary sinus     Right Ear: Tympanic membrane, ear canal and external ear normal.      Left Ear: Tympanic membrane, ear canal and external ear normal.      Mouth/Throat:      Comments: PND  Eyes:      Extraocular Movements: Extraocular movements intact.      Pupils: Pupils are equal, round, and reactive to light.   Cardiovascular:      Rate and Rhythm: Normal rate and regular rhythm.      Heart sounds: Normal heart sounds.   Pulmonary:      Breath sounds: Normal breath sounds. No wheezing.   Neurological:      Mental Status: He is alert and oriented to person, place, and time.   Psychiatric:         Mood and Affect: Mood normal.         Behavior: Behavior normal.                   "

## 2024-12-18 NOTE — PATIENT INSTRUCTIONS
Patient was educated on URI and PND. Patient was educated on steroids and antibiotics. Patient was told to eat on antibiotics. Patient was told to not take OTC anti-inflammatories while on steroids. Any chest pain or shortness of breath go to ED. If cough persist recommend chest xray.

## 2025-04-07 ENCOUNTER — APPOINTMENT (OUTPATIENT)
Dept: RADIOLOGY | Facility: CLINIC | Age: 70
End: 2025-04-07
Payer: MEDICARE

## 2025-04-07 DIAGNOSIS — R05.1 ACUTE COUGH: ICD-10-CM

## 2025-04-07 PROCEDURE — 71046 X-RAY EXAM CHEST 2 VIEWS: CPT

## 2025-04-23 ENCOUNTER — APPOINTMENT (OUTPATIENT)
Dept: RADIOLOGY | Facility: CLINIC | Age: 70
End: 2025-04-23
Payer: MEDICARE

## 2025-04-23 DIAGNOSIS — R09.81 NASAL CONGESTION: ICD-10-CM

## 2025-04-23 PROCEDURE — 70220 X-RAY EXAM OF SINUSES: CPT
